# Patient Record
Sex: FEMALE | Race: WHITE | NOT HISPANIC OR LATINO
[De-identification: names, ages, dates, MRNs, and addresses within clinical notes are randomized per-mention and may not be internally consistent; named-entity substitution may affect disease eponyms.]

---

## 2017-04-06 PROBLEM — Z00.00 ENCOUNTER FOR PREVENTIVE HEALTH EXAMINATION: Status: ACTIVE | Noted: 2017-04-06

## 2017-04-17 ENCOUNTER — APPOINTMENT (OUTPATIENT)
Dept: ORTHOPEDIC SURGERY | Facility: CLINIC | Age: 82
End: 2017-04-17

## 2017-04-17 VITALS
HEIGHT: 59 IN | WEIGHT: 125 LBS | SYSTOLIC BLOOD PRESSURE: 174 MMHG | DIASTOLIC BLOOD PRESSURE: 80 MMHG | BODY MASS INDEX: 25.2 KG/M2

## 2017-05-22 ENCOUNTER — OUTPATIENT (OUTPATIENT)
Dept: OUTPATIENT SERVICES | Facility: HOSPITAL | Age: 82
LOS: 1 days | End: 2017-05-22
Payer: MEDICARE

## 2017-05-22 VITALS
WEIGHT: 126.1 LBS | HEART RATE: 77 BPM | SYSTOLIC BLOOD PRESSURE: 142 MMHG | RESPIRATION RATE: 16 BRPM | HEIGHT: 59 IN | TEMPERATURE: 98 F | DIASTOLIC BLOOD PRESSURE: 70 MMHG

## 2017-05-22 DIAGNOSIS — Z87.442 PERSONAL HISTORY OF URINARY CALCULI: Chronic | ICD-10-CM

## 2017-05-22 DIAGNOSIS — E89.0 POSTPROCEDURAL HYPOTHYROIDISM: Chronic | ICD-10-CM

## 2017-05-22 DIAGNOSIS — M48.07 SPINAL STENOSIS, LUMBOSACRAL REGION: ICD-10-CM

## 2017-05-22 DIAGNOSIS — Z98.890 OTHER SPECIFIED POSTPROCEDURAL STATES: Chronic | ICD-10-CM

## 2017-05-22 DIAGNOSIS — K57.90 DIVERTICULOSIS OF INTESTINE, PART UNSPECIFIED, WITHOUT PERFORATION OR ABSCESS WITHOUT BLEEDING: Chronic | ICD-10-CM

## 2017-05-22 DIAGNOSIS — Z90.49 ACQUIRED ABSENCE OF OTHER SPECIFIED PARTS OF DIGESTIVE TRACT: Chronic | ICD-10-CM

## 2017-05-22 DIAGNOSIS — I10 ESSENTIAL (PRIMARY) HYPERTENSION: ICD-10-CM

## 2017-05-22 DIAGNOSIS — E03.9 HYPOTHYROIDISM, UNSPECIFIED: ICD-10-CM

## 2017-05-22 DIAGNOSIS — Z90.710 ACQUIRED ABSENCE OF BOTH CERVIX AND UTERUS: Chronic | ICD-10-CM

## 2017-05-22 DIAGNOSIS — M48.00 SPINAL STENOSIS, SITE UNSPECIFIED: ICD-10-CM

## 2017-05-22 LAB
BLD GP AB SCN SERPL QL: NEGATIVE — SIGNIFICANT CHANGE UP
BUN SERPL-MCNC: 25 MG/DL — HIGH (ref 7–23)
CALCIUM SERPL-MCNC: 9.8 MG/DL — SIGNIFICANT CHANGE UP (ref 8.4–10.5)
CHLORIDE SERPL-SCNC: 102 MMOL/L — SIGNIFICANT CHANGE UP (ref 98–107)
CO2 SERPL-SCNC: 24 MMOL/L — SIGNIFICANT CHANGE UP (ref 22–31)
CREAT SERPL-MCNC: 1.03 MG/DL — SIGNIFICANT CHANGE UP (ref 0.5–1.3)
GLUCOSE SERPL-MCNC: 87 MG/DL — SIGNIFICANT CHANGE UP (ref 70–99)
HCT VFR BLD CALC: 38.9 % — SIGNIFICANT CHANGE UP (ref 34.5–45)
HGB BLD-MCNC: 12.5 G/DL — SIGNIFICANT CHANGE UP (ref 11.5–15.5)
MCHC RBC-ENTMCNC: 30.7 PG — SIGNIFICANT CHANGE UP (ref 27–34)
MCHC RBC-ENTMCNC: 32.1 % — SIGNIFICANT CHANGE UP (ref 32–36)
MCV RBC AUTO: 95.6 FL — SIGNIFICANT CHANGE UP (ref 80–100)
PLATELET # BLD AUTO: 392 K/UL — SIGNIFICANT CHANGE UP (ref 150–400)
PMV BLD: 9.6 FL — SIGNIFICANT CHANGE UP (ref 7–13)
POTASSIUM SERPL-MCNC: 4.7 MMOL/L — SIGNIFICANT CHANGE UP (ref 3.5–5.3)
POTASSIUM SERPL-SCNC: 4.7 MMOL/L — SIGNIFICANT CHANGE UP (ref 3.5–5.3)
RBC # BLD: 4.07 M/UL — SIGNIFICANT CHANGE UP (ref 3.8–5.2)
RBC # FLD: 12.6 % — SIGNIFICANT CHANGE UP (ref 10.3–14.5)
RH IG SCN BLD-IMP: NEGATIVE — SIGNIFICANT CHANGE UP
SODIUM SERPL-SCNC: 142 MMOL/L — SIGNIFICANT CHANGE UP (ref 135–145)
TSH SERPL-MCNC: 0.39 UIU/ML — SIGNIFICANT CHANGE UP (ref 0.27–4.2)
WBC # BLD: 8.23 K/UL — SIGNIFICANT CHANGE UP (ref 3.8–10.5)
WBC # FLD AUTO: 8.23 K/UL — SIGNIFICANT CHANGE UP (ref 3.8–10.5)

## 2017-05-22 PROCEDURE — 93010 ELECTROCARDIOGRAM REPORT: CPT

## 2017-05-22 NOTE — H&P PST ADULT - MUSCULOSKELETAL COMMENTS
Hx of arthritis pain for many years - pt c/o of lower back and left leg radiation with swelling of wrists and pain in shoulders, elbows and hips. Pt uses pain management inconsistently Pt c/o of pain over lower back, left leg and ankle, elbows. wrists. shoulders. Noted swelling of left wrist joint. Hx of arthritis pain for many years - pt c/o of lower back pain up to 9/10 and left leg radiation with swelling of wrists and pain in shoulders, elbows and hips. Pt uses pain management inconsistently

## 2017-05-22 NOTE — H&P PST ADULT - PMH
Detached retina    Diverticulosis    DVT (deep venous thrombosis)    HTN (hypertension)    Hypothyroid    OA (osteoarthritis)    OP (osteoporosis)    RA (rheumatoid arthritis)    Reflux esophagitis    Spinal stenosis

## 2017-05-22 NOTE — H&P PST ADULT - NEUROLOGICAL DETAILS
normal strength/sensation intact/responds to verbal commands/responds to pain/alert and oriented x 3

## 2017-05-22 NOTE — H&P PST ADULT - BACK EXAM
decreased strength/normal shape/difficulty with ROM r/t pain - occasionally uses cane and W/C to get around

## 2017-05-22 NOTE — H&P PST ADULT - PROBLEM SELECTOR PLAN 1
Pt given pre-op instructions and Famotidine and chlorhexidine and verbalized understanding.  Pt to see PCP for MC as per surgeon and to request copy.   OR Booking notified of IVC filter via fax.   Pt to take Prednisone and hydroxychloroquine am DOS

## 2017-05-22 NOTE — H&P PST ADULT - HISTORY OF PRESENT ILLNESS
Pt is a 83 yr old female scheduled for L3-4, L5-S1 Lumbar Laminectomy with Dr albarran 6/1/17. Pt c/o of chronic lower back pain for many years Pt is a 83 yr old female scheduled for L3-4, L5-S1 Lumbar Laminectomy with Dr albarran 6/1/17. Pt c/o of chronic lower back pain for many years with Hx of prior [id number]. Pt c/o of pain 10/10 - relieved partially with pain medication that she uses inconsistently. Pt has hx of RA and OA and on low dose Prednisone. Hx of DVT x2 in 2009 post-op surgery and has IVC filter in place.

## 2017-05-22 NOTE — H&P PST ADULT - NEGATIVE ENMT SYMPTOMS
no throat pain/no sinus symptoms/no dysphagia/no tinnitus/no vertigo/no nasal congestion/no nasal discharge/no ear pain

## 2017-05-22 NOTE — H&P PST ADULT - PSH
Diverticulosis  2004 - colon surgery  H/O abdominal hysterectomy  1977  H/O abdominal surgery  removal of appendix scarring - DVT postop  H/O laminectomy  1984  H/O total thyroidectomy  2008  History of appendectomy  1952  History of bladder surgery  1985  History of cholecystectomy  2015  History of kidney stones  1968

## 2017-05-24 ENCOUNTER — APPOINTMENT (OUTPATIENT)
Dept: ORTHOPEDIC SURGERY | Facility: CLINIC | Age: 82
End: 2017-05-24

## 2017-05-24 VITALS
DIASTOLIC BLOOD PRESSURE: 78 MMHG | HEIGHT: 59 IN | SYSTOLIC BLOOD PRESSURE: 167 MMHG | BODY MASS INDEX: 25.2 KG/M2 | HEART RATE: 102 BPM | WEIGHT: 125 LBS

## 2017-06-01 ENCOUNTER — TRANSCRIPTION ENCOUNTER (OUTPATIENT)
Age: 82
End: 2017-06-01

## 2017-06-01 ENCOUNTER — APPOINTMENT (OUTPATIENT)
Dept: ORTHOPEDIC SURGERY | Facility: HOSPITAL | Age: 82
End: 2017-06-01

## 2017-06-01 ENCOUNTER — INPATIENT (INPATIENT)
Facility: HOSPITAL | Age: 82
LOS: 4 days | Discharge: NURSING, REHAB OR LONG-TERM CARE | End: 2017-06-06
Attending: ORTHOPAEDIC SURGERY | Admitting: ORTHOPAEDIC SURGERY
Payer: MEDICARE

## 2017-06-01 VITALS
TEMPERATURE: 98 F | OXYGEN SATURATION: 100 % | DIASTOLIC BLOOD PRESSURE: 72 MMHG | SYSTOLIC BLOOD PRESSURE: 165 MMHG | RESPIRATION RATE: 16 BRPM | HEART RATE: 80 BPM | WEIGHT: 126.1 LBS | HEIGHT: 59 IN

## 2017-06-01 DIAGNOSIS — Z90.49 ACQUIRED ABSENCE OF OTHER SPECIFIED PARTS OF DIGESTIVE TRACT: Chronic | ICD-10-CM

## 2017-06-01 DIAGNOSIS — Z90.710 ACQUIRED ABSENCE OF BOTH CERVIX AND UTERUS: Chronic | ICD-10-CM

## 2017-06-01 DIAGNOSIS — Z98.890 OTHER SPECIFIED POSTPROCEDURAL STATES: Chronic | ICD-10-CM

## 2017-06-01 DIAGNOSIS — E89.0 POSTPROCEDURAL HYPOTHYROIDISM: Chronic | ICD-10-CM

## 2017-06-01 DIAGNOSIS — Z87.442 PERSONAL HISTORY OF URINARY CALCULI: Chronic | ICD-10-CM

## 2017-06-01 DIAGNOSIS — K57.90 DIVERTICULOSIS OF INTESTINE, PART UNSPECIFIED, WITHOUT PERFORATION OR ABSCESS WITHOUT BLEEDING: Chronic | ICD-10-CM

## 2017-06-01 DIAGNOSIS — M48.07 SPINAL STENOSIS, LUMBOSACRAL REGION: ICD-10-CM

## 2017-06-01 LAB
BASOPHILS # BLD AUTO: 0.04 K/UL — SIGNIFICANT CHANGE UP (ref 0–0.2)
BASOPHILS NFR BLD AUTO: 0.3 % — SIGNIFICANT CHANGE UP (ref 0–2)
BUN SERPL-MCNC: 27 MG/DL — HIGH (ref 7–23)
CALCIUM SERPL-MCNC: 9.2 MG/DL — SIGNIFICANT CHANGE UP (ref 8.4–10.5)
CHLORIDE SERPL-SCNC: 105 MMOL/L — SIGNIFICANT CHANGE UP (ref 98–107)
CO2 SERPL-SCNC: 26 MMOL/L — SIGNIFICANT CHANGE UP (ref 22–31)
CREAT SERPL-MCNC: 1.16 MG/DL — SIGNIFICANT CHANGE UP (ref 0.5–1.3)
EOSINOPHIL # BLD AUTO: 0.01 K/UL — SIGNIFICANT CHANGE UP (ref 0–0.5)
EOSINOPHIL NFR BLD AUTO: 0.1 % — SIGNIFICANT CHANGE UP (ref 0–6)
GLUCOSE SERPL-MCNC: 131 MG/DL — HIGH (ref 70–99)
HCT VFR BLD CALC: 33.5 % — LOW (ref 34.5–45)
HGB BLD-MCNC: 10.6 G/DL — LOW (ref 11.5–15.5)
IMM GRANULOCYTES NFR BLD AUTO: 0.4 % — SIGNIFICANT CHANGE UP (ref 0–1.5)
LYMPHOCYTES # BLD AUTO: 1.07 K/UL — SIGNIFICANT CHANGE UP (ref 1–3.3)
LYMPHOCYTES # BLD AUTO: 7.3 % — LOW (ref 13–44)
MCHC RBC-ENTMCNC: 30.1 PG — SIGNIFICANT CHANGE UP (ref 27–34)
MCHC RBC-ENTMCNC: 31.6 % — LOW (ref 32–36)
MCV RBC AUTO: 95.2 FL — SIGNIFICANT CHANGE UP (ref 80–100)
MONOCYTES # BLD AUTO: 0.68 K/UL — SIGNIFICANT CHANGE UP (ref 0–0.9)
MONOCYTES NFR BLD AUTO: 4.6 % — SIGNIFICANT CHANGE UP (ref 2–14)
NEUTROPHILS # BLD AUTO: 12.84 K/UL — HIGH (ref 1.8–7.4)
NEUTROPHILS NFR BLD AUTO: 87.3 % — HIGH (ref 43–77)
PLATELET # BLD AUTO: 354 K/UL — SIGNIFICANT CHANGE UP (ref 150–400)
PMV BLD: 9.4 FL — SIGNIFICANT CHANGE UP (ref 7–13)
POTASSIUM SERPL-MCNC: 4.4 MMOL/L — SIGNIFICANT CHANGE UP (ref 3.5–5.3)
POTASSIUM SERPL-SCNC: 4.4 MMOL/L — SIGNIFICANT CHANGE UP (ref 3.5–5.3)
RBC # BLD: 3.52 M/UL — LOW (ref 3.8–5.2)
RBC # FLD: 12.6 % — SIGNIFICANT CHANGE UP (ref 10.3–14.5)
RH IG SCN BLD-IMP: NEGATIVE — SIGNIFICANT CHANGE UP
SODIUM SERPL-SCNC: 143 MMOL/L — SIGNIFICANT CHANGE UP (ref 135–145)
WBC # BLD: 14.7 K/UL — HIGH (ref 3.8–10.5)
WBC # FLD AUTO: 14.7 K/UL — HIGH (ref 3.8–10.5)

## 2017-06-01 PROCEDURE — 63047 LAM FACETEC & FORAMOT LUMBAR: CPT

## 2017-06-01 PROCEDURE — 63710 GRAFT REPAIR OF SPINE DEFECT: CPT | Mod: 59

## 2017-06-01 PROCEDURE — 63048 LAM FACETEC &FORAMOT EA ADDL: CPT

## 2017-06-01 PROCEDURE — 63710 GRAFT REPAIR OF SPINE DEFECT: CPT | Mod: 82,59

## 2017-06-01 PROCEDURE — 63047 LAM FACETEC & FORAMOT LUMBAR: CPT | Mod: 82

## 2017-06-01 PROCEDURE — 63048 LAM FACETEC &FORAMOT EA ADDL: CPT | Mod: 82

## 2017-06-01 PROCEDURE — 72100 X-RAY EXAM L-S SPINE 2/3 VWS: CPT | Mod: 26

## 2017-06-01 RX ORDER — HYDROXYCHLOROQUINE SULFATE 200 MG
200 TABLET ORAL DAILY
Qty: 0 | Refills: 0 | Status: DISCONTINUED | OUTPATIENT
Start: 2017-06-01 | End: 2017-06-02

## 2017-06-01 RX ORDER — ACETAMINOPHEN 500 MG
650 TABLET ORAL EVERY 6 HOURS
Qty: 0 | Refills: 0 | Status: DISCONTINUED | OUTPATIENT
Start: 2017-06-01 | End: 2017-06-02

## 2017-06-01 RX ORDER — DULOXETINE HYDROCHLORIDE 30 MG/1
60 CAPSULE, DELAYED RELEASE ORAL DAILY
Qty: 0 | Refills: 0 | Status: DISCONTINUED | OUTPATIENT
Start: 2017-06-01 | End: 2017-06-06

## 2017-06-01 RX ORDER — DIPHENHYDRAMINE HCL 50 MG
12.5 CAPSULE ORAL EVERY 4 HOURS
Qty: 0 | Refills: 0 | Status: DISCONTINUED | OUTPATIENT
Start: 2017-06-01 | End: 2017-06-06

## 2017-06-01 RX ORDER — SODIUM CHLORIDE 9 MG/ML
1000 INJECTION, SOLUTION INTRAVENOUS
Qty: 0 | Refills: 0 | Status: DISCONTINUED | OUTPATIENT
Start: 2017-06-01 | End: 2017-06-02

## 2017-06-01 RX ORDER — ONDANSETRON 8 MG/1
4 TABLET, FILM COATED ORAL EVERY 6 HOURS
Qty: 0 | Refills: 0 | Status: DISCONTINUED | OUTPATIENT
Start: 2017-06-01 | End: 2017-06-06

## 2017-06-01 RX ORDER — FLUOCINONIDE/EMOLLIENT BASE 0.05 %
1 CREAM (GRAM) TOPICAL THREE TIMES A DAY
Qty: 0 | Refills: 0 | Status: DISCONTINUED | OUTPATIENT
Start: 2017-06-01 | End: 2017-06-06

## 2017-06-01 RX ORDER — MOMETASONE FUROATE 50 UG/1
2 SPRAY NASAL
Qty: 0 | Refills: 0 | COMMUNITY

## 2017-06-01 RX ORDER — ENOXAPARIN SODIUM 100 MG/ML
30 INJECTION SUBCUTANEOUS EVERY 24 HOURS
Qty: 0 | Refills: 0 | Status: COMPLETED | OUTPATIENT
Start: 2017-06-02 | End: 2017-06-03

## 2017-06-01 RX ORDER — ACETAMINOPHEN 500 MG
650 TABLET ORAL EVERY 6 HOURS
Qty: 0 | Refills: 0 | Status: DISCONTINUED | OUTPATIENT
Start: 2017-06-01 | End: 2017-06-06

## 2017-06-01 RX ORDER — CEFAZOLIN SODIUM 1 G
1000 VIAL (EA) INJECTION EVERY 8 HOURS
Qty: 0 | Refills: 0 | Status: COMPLETED | OUTPATIENT
Start: 2017-06-01 | End: 2017-06-02

## 2017-06-01 RX ORDER — DOCUSATE SODIUM 100 MG
100 CAPSULE ORAL THREE TIMES A DAY
Qty: 0 | Refills: 0 | Status: DISCONTINUED | OUTPATIENT
Start: 2017-06-01 | End: 2017-06-06

## 2017-06-01 RX ORDER — ONDANSETRON 8 MG/1
4 TABLET, FILM COATED ORAL ONCE
Qty: 0 | Refills: 0 | Status: DISCONTINUED | OUTPATIENT
Start: 2017-06-01 | End: 2017-06-01

## 2017-06-01 RX ORDER — HYDROMORPHONE HYDROCHLORIDE 2 MG/ML
0.25 INJECTION INTRAMUSCULAR; INTRAVENOUS; SUBCUTANEOUS
Qty: 0 | Refills: 0 | Status: DISCONTINUED | OUTPATIENT
Start: 2017-06-01 | End: 2017-06-01

## 2017-06-01 RX ORDER — HYDROMORPHONE HYDROCHLORIDE 2 MG/ML
0.5 INJECTION INTRAMUSCULAR; INTRAVENOUS; SUBCUTANEOUS
Qty: 0 | Refills: 0 | Status: DISCONTINUED | OUTPATIENT
Start: 2017-06-01 | End: 2017-06-01

## 2017-06-01 RX ORDER — VALSARTAN 80 MG/1
160 TABLET ORAL DAILY
Qty: 0 | Refills: 0 | Status: DISCONTINUED | OUTPATIENT
Start: 2017-06-01 | End: 2017-06-06

## 2017-06-01 RX ORDER — FLUTICASONE PROPIONATE 50 MCG
2 SPRAY, SUSPENSION NASAL DAILY
Qty: 0 | Refills: 0 | Status: DISCONTINUED | OUTPATIENT
Start: 2017-06-01 | End: 2017-06-06

## 2017-06-01 RX ORDER — MORPHINE SULFATE 50 MG/1
4 CAPSULE, EXTENDED RELEASE ORAL EVERY 4 HOURS
Qty: 0 | Refills: 0 | Status: DISCONTINUED | OUTPATIENT
Start: 2017-06-01 | End: 2017-06-02

## 2017-06-01 RX ORDER — SODIUM CHLORIDE 9 MG/ML
1000 INJECTION INTRAMUSCULAR; INTRAVENOUS; SUBCUTANEOUS
Qty: 0 | Refills: 0 | Status: DISCONTINUED | OUTPATIENT
Start: 2017-06-01 | End: 2017-06-04

## 2017-06-01 RX ORDER — SENNA PLUS 8.6 MG/1
2 TABLET ORAL AT BEDTIME
Qty: 0 | Refills: 0 | Status: DISCONTINUED | OUTPATIENT
Start: 2017-06-01 | End: 2017-06-06

## 2017-06-01 RX ORDER — GABAPENTIN 400 MG/1
200 CAPSULE ORAL AT BEDTIME
Qty: 0 | Refills: 0 | Status: DISCONTINUED | OUTPATIENT
Start: 2017-06-01 | End: 2017-06-02

## 2017-06-01 RX ORDER — FAMOTIDINE 10 MG/ML
20 INJECTION INTRAVENOUS EVERY 24 HOURS
Qty: 0 | Refills: 0 | Status: DISCONTINUED | OUTPATIENT
Start: 2017-06-01 | End: 2017-06-02

## 2017-06-01 RX ORDER — SODIUM CHLORIDE 9 MG/ML
500 INJECTION, SOLUTION INTRAVENOUS ONCE
Qty: 0 | Refills: 0 | Status: COMPLETED | OUTPATIENT
Start: 2017-06-01 | End: 2017-06-01

## 2017-06-01 RX ORDER — LEVOTHYROXINE SODIUM 125 MCG
88 TABLET ORAL DAILY
Qty: 0 | Refills: 0 | Status: DISCONTINUED | OUTPATIENT
Start: 2017-06-01 | End: 2017-06-06

## 2017-06-01 RX ADMIN — MORPHINE SULFATE 4 MILLIGRAM(S): 50 CAPSULE, EXTENDED RELEASE ORAL at 23:57

## 2017-06-01 RX ADMIN — SODIUM CHLORIDE 1000 MILLILITER(S): 9 INJECTION, SOLUTION INTRAVENOUS at 18:05

## 2017-06-01 RX ADMIN — HYDROMORPHONE HYDROCHLORIDE 0.5 MILLIGRAM(S): 2 INJECTION INTRAMUSCULAR; INTRAVENOUS; SUBCUTANEOUS at 16:52

## 2017-06-01 RX ADMIN — SODIUM CHLORIDE 30 MILLILITER(S): 9 INJECTION, SOLUTION INTRAVENOUS at 15:45

## 2017-06-01 RX ADMIN — HYDROMORPHONE HYDROCHLORIDE 0.5 MILLIGRAM(S): 2 INJECTION INTRAMUSCULAR; INTRAVENOUS; SUBCUTANEOUS at 19:05

## 2017-06-01 RX ADMIN — SENNA PLUS 2 TABLET(S): 8.6 TABLET ORAL at 21:42

## 2017-06-01 RX ADMIN — Medication 100 MILLIGRAM(S): at 21:42

## 2017-06-01 RX ADMIN — HYDROMORPHONE HYDROCHLORIDE 0.5 MILLIGRAM(S): 2 INJECTION INTRAMUSCULAR; INTRAVENOUS; SUBCUTANEOUS at 16:17

## 2017-06-01 RX ADMIN — GABAPENTIN 200 MILLIGRAM(S): 400 CAPSULE ORAL at 21:42

## 2017-06-01 RX ADMIN — HYDROMORPHONE HYDROCHLORIDE 0.5 MILLIGRAM(S): 2 INJECTION INTRAMUSCULAR; INTRAVENOUS; SUBCUTANEOUS at 16:10

## 2017-06-01 RX ADMIN — SODIUM CHLORIDE 75 MILLILITER(S): 9 INJECTION INTRAMUSCULAR; INTRAVENOUS; SUBCUTANEOUS at 16:00

## 2017-06-01 RX ADMIN — HYDROMORPHONE HYDROCHLORIDE 0.5 MILLIGRAM(S): 2 INJECTION INTRAMUSCULAR; INTRAVENOUS; SUBCUTANEOUS at 17:00

## 2017-06-01 RX ADMIN — MORPHINE SULFATE 4 MILLIGRAM(S): 50 CAPSULE, EXTENDED RELEASE ORAL at 23:43

## 2017-06-01 RX ADMIN — HYDROMORPHONE HYDROCHLORIDE 0.5 MILLIGRAM(S): 2 INJECTION INTRAMUSCULAR; INTRAVENOUS; SUBCUTANEOUS at 18:56

## 2017-06-01 NOTE — BRIEF OPERATIVE NOTE - PROCEDURE
Duraplasty  06/01/2017    Active  [email or web address]  Laminectomy of lumbar spine at 3 levels  06/01/2017    Active  BARBARA

## 2017-06-01 NOTE — PROGRESS NOTE ADULT - SUBJECTIVE AND OBJECTIVE BOX
Patient is seen and examined. Denies CP/SOB/Dizziness/N/V/D/HA. Pain is controlled.     Vital Signs Last 24 Hrs  T(C): 36.8, Max: 36.8 (06-01 @ 15:45)  T(F): 98.2, Max: 98.2 (06-01 @ 15:45)  HR: 84 (71 - 91)  BP: 127/59 (125/53 - 165/72)  BP(mean): --  RR: 18 (13 - 24)  SpO2: 98% (96% - 100%)    Gen: NAD    BACK: Dressing C/D/I. Drain in place (sewn). HOB Flat  Compartments are soft, extremities are warm. Motor intact 5/5 BL EHL/FHL/TA/GS. Sensation is grossly intact in the BL LE. 1+DP BL LE.     Labs:                           10.6   14.70 )-----------( 354      ( 01 Jun 2017 16:51 )             33.5     PENDING BMP Patient is seen and examined. Denies CP/SOB/Dizziness/N/V/D/HA. Pain is controlled.     Vital Signs Last 24 Hrs  T(C): 36.8, Max: 36.8 (06-01 @ 15:45)  T(F): 98.2, Max: 98.2 (06-01 @ 15:45)  HR: 84 (71 - 91)  BP: 127/59 (125/53 - 165/72)  BP(mean): --  RR: 18 (13 - 24)  SpO2: 98% (96% - 100%)    Gen: NAD    BACK: Dressing C/D/I. HOB Flat  Compartments are soft, extremities are warm. Motor intact 5/5 BL EHL/FHL/TA/GS. Sensation is grossly intact in the BL LE. 1+DP BL LE.     Labs:                           10.6   14.70 )-----------( 354      ( 01 Jun 2017 16:51 )             33.5     PENDING BMP

## 2017-06-01 NOTE — PROGRESS NOTE ADULT - PROBLEM SELECTOR PLAN 1
-Pain control/Analgesia  -Inc Spirometry  -Venodynes  -F/U AM Labs  -Continue to monitor motor sensory exam  -HOB Flat, NOT TO BE OUT OF BED UNTIL CLEARED BY DR GARCIA - approx 48 hours  -PT/OT - When cleared by Dr Garcia  -WBAT when cleared by Dr Garcia  -Monitor Drain output  -Notify Ortho with any questions -Pain control/Analgesia  -Inc Spirometry  -Venodynes  -F/U AM Labs  -Continue to monitor motor sensory exam  -HOB Flat, NOT TO BE OUT OF BED UNTIL CLEARED BY DR GARCIA - approx 48 hours  -PT/OT - When cleared by Dr Garcia  -WBAT when cleared by Dr Garcia  -Notify Ortho with any questions

## 2017-06-02 DIAGNOSIS — E03.9 HYPOTHYROIDISM, UNSPECIFIED: ICD-10-CM

## 2017-06-02 DIAGNOSIS — G96.11 DURAL TEAR: ICD-10-CM

## 2017-06-02 DIAGNOSIS — K21.0 GASTRO-ESOPHAGEAL REFLUX DISEASE WITH ESOPHAGITIS: ICD-10-CM

## 2017-06-02 DIAGNOSIS — Z98.890 OTHER SPECIFIED POSTPROCEDURAL STATES: ICD-10-CM

## 2017-06-02 DIAGNOSIS — D50.0 IRON DEFICIENCY ANEMIA SECONDARY TO BLOOD LOSS (CHRONIC): ICD-10-CM

## 2017-06-02 DIAGNOSIS — M48.00 SPINAL STENOSIS, SITE UNSPECIFIED: ICD-10-CM

## 2017-06-02 DIAGNOSIS — I82.409 ACUTE EMBOLISM AND THROMBOSIS OF UNSPECIFIED DEEP VEINS OF UNSPECIFIED LOWER EXTREMITY: ICD-10-CM

## 2017-06-02 DIAGNOSIS — D72.829 ELEVATED WHITE BLOOD CELL COUNT, UNSPECIFIED: ICD-10-CM

## 2017-06-02 DIAGNOSIS — M06.9 RHEUMATOID ARTHRITIS, UNSPECIFIED: ICD-10-CM

## 2017-06-02 LAB
BASOPHILS # BLD AUTO: 0.02 K/UL — SIGNIFICANT CHANGE UP (ref 0–0.2)
BASOPHILS NFR BLD AUTO: 0.2 % — SIGNIFICANT CHANGE UP (ref 0–2)
BUN SERPL-MCNC: 20 MG/DL — SIGNIFICANT CHANGE UP (ref 7–23)
CALCIUM SERPL-MCNC: 8.7 MG/DL — SIGNIFICANT CHANGE UP (ref 8.4–10.5)
CHLORIDE SERPL-SCNC: 105 MMOL/L — SIGNIFICANT CHANGE UP (ref 98–107)
CO2 SERPL-SCNC: 23 MMOL/L — SIGNIFICANT CHANGE UP (ref 22–31)
CREAT SERPL-MCNC: 0.88 MG/DL — SIGNIFICANT CHANGE UP (ref 0.5–1.3)
EOSINOPHIL # BLD AUTO: 0.02 K/UL — SIGNIFICANT CHANGE UP (ref 0–0.5)
EOSINOPHIL NFR BLD AUTO: 0.2 % — SIGNIFICANT CHANGE UP (ref 0–6)
GLUCOSE SERPL-MCNC: 90 MG/DL — SIGNIFICANT CHANGE UP (ref 70–99)
HCT VFR BLD CALC: 29 % — LOW (ref 34.5–45)
HGB BLD-MCNC: 9.1 G/DL — LOW (ref 11.5–15.5)
IMM GRANULOCYTES NFR BLD AUTO: 0.2 % — SIGNIFICANT CHANGE UP (ref 0–1.5)
LYMPHOCYTES # BLD AUTO: 0.98 K/UL — LOW (ref 1–3.3)
LYMPHOCYTES # BLD AUTO: 7.5 % — LOW (ref 13–44)
MCHC RBC-ENTMCNC: 30 PG — SIGNIFICANT CHANGE UP (ref 27–34)
MCHC RBC-ENTMCNC: 31.4 % — LOW (ref 32–36)
MCV RBC AUTO: 95.7 FL — SIGNIFICANT CHANGE UP (ref 80–100)
MONOCYTES # BLD AUTO: 1.65 K/UL — HIGH (ref 0–0.9)
MONOCYTES NFR BLD AUTO: 12.6 % — SIGNIFICANT CHANGE UP (ref 2–14)
NEUTROPHILS # BLD AUTO: 10.36 K/UL — HIGH (ref 1.8–7.4)
NEUTROPHILS NFR BLD AUTO: 79.3 % — HIGH (ref 43–77)
PLATELET # BLD AUTO: 320 K/UL — SIGNIFICANT CHANGE UP (ref 150–400)
PMV BLD: 9.4 FL — SIGNIFICANT CHANGE UP (ref 7–13)
POTASSIUM SERPL-MCNC: 4 MMOL/L — SIGNIFICANT CHANGE UP (ref 3.5–5.3)
POTASSIUM SERPL-SCNC: 4 MMOL/L — SIGNIFICANT CHANGE UP (ref 3.5–5.3)
RBC # BLD: 3.03 M/UL — LOW (ref 3.8–5.2)
RBC # FLD: 12.7 % — SIGNIFICANT CHANGE UP (ref 10.3–14.5)
SODIUM SERPL-SCNC: 142 MMOL/L — SIGNIFICANT CHANGE UP (ref 135–145)
WBC # BLD: 13.06 K/UL — HIGH (ref 3.8–10.5)
WBC # FLD AUTO: 13.06 K/UL — HIGH (ref 3.8–10.5)

## 2017-06-02 PROCEDURE — 99223 1ST HOSP IP/OBS HIGH 75: CPT

## 2017-06-02 RX ORDER — FAMOTIDINE 10 MG/ML
20 INJECTION INTRAVENOUS DAILY
Qty: 0 | Refills: 0 | Status: DISCONTINUED | OUTPATIENT
Start: 2017-06-02 | End: 2017-06-06

## 2017-06-02 RX ORDER — POLYETHYLENE GLYCOL 3350 17 G/17G
17 POWDER, FOR SOLUTION ORAL DAILY
Qty: 0 | Refills: 0 | Status: DISCONTINUED | OUTPATIENT
Start: 2017-06-02 | End: 2017-06-06

## 2017-06-02 RX ORDER — HYDROXYCHLOROQUINE SULFATE 200 MG
200 TABLET ORAL
Qty: 0 | Refills: 0 | Status: DISCONTINUED | OUTPATIENT
Start: 2017-06-02 | End: 2017-06-05

## 2017-06-02 RX ORDER — GABAPENTIN 400 MG/1
200 CAPSULE ORAL AT BEDTIME
Qty: 0 | Refills: 0 | Status: DISCONTINUED | OUTPATIENT
Start: 2017-06-02 | End: 2017-06-06

## 2017-06-02 RX ORDER — CYCLOBENZAPRINE HYDROCHLORIDE 10 MG/1
5 TABLET, FILM COATED ORAL THREE TIMES A DAY
Qty: 0 | Refills: 0 | Status: DISCONTINUED | OUTPATIENT
Start: 2017-06-02 | End: 2017-06-06

## 2017-06-02 RX ORDER — OXYCODONE HYDROCHLORIDE 5 MG/1
10 TABLET ORAL EVERY 4 HOURS
Qty: 0 | Refills: 0 | Status: DISCONTINUED | OUTPATIENT
Start: 2017-06-02 | End: 2017-06-06

## 2017-06-02 RX ORDER — OXYCODONE HYDROCHLORIDE 5 MG/1
5 TABLET ORAL EVERY 4 HOURS
Qty: 0 | Refills: 0 | Status: DISCONTINUED | OUTPATIENT
Start: 2017-06-02 | End: 2017-06-06

## 2017-06-02 RX ORDER — MORPHINE SULFATE 50 MG/1
2 CAPSULE, EXTENDED RELEASE ORAL EVERY 4 HOURS
Qty: 0 | Refills: 0 | Status: DISCONTINUED | OUTPATIENT
Start: 2017-06-02 | End: 2017-06-06

## 2017-06-02 RX ORDER — HYDROMORPHONE HYDROCHLORIDE 2 MG/ML
0.5 INJECTION INTRAMUSCULAR; INTRAVENOUS; SUBCUTANEOUS ONCE
Qty: 0 | Refills: 0 | Status: DISCONTINUED | OUTPATIENT
Start: 2017-06-02 | End: 2017-06-02

## 2017-06-02 RX ORDER — ACETAMINOPHEN 500 MG
650 TABLET ORAL EVERY 6 HOURS
Qty: 0 | Refills: 0 | Status: COMPLETED | OUTPATIENT
Start: 2017-06-02 | End: 2017-06-04

## 2017-06-02 RX ORDER — OXYCODONE HYDROCHLORIDE 5 MG/1
5 TABLET ORAL EVERY 4 HOURS
Qty: 0 | Refills: 0 | Status: DISCONTINUED | OUTPATIENT
Start: 2017-06-02 | End: 2017-06-02

## 2017-06-02 RX ORDER — OXYCODONE HYDROCHLORIDE 5 MG/1
2.5 TABLET ORAL EVERY 4 HOURS
Qty: 0 | Refills: 0 | Status: DISCONTINUED | OUTPATIENT
Start: 2017-06-02 | End: 2017-06-06

## 2017-06-02 RX ORDER — HYDROMORPHONE HYDROCHLORIDE 2 MG/ML
0.5 INJECTION INTRAMUSCULAR; INTRAVENOUS; SUBCUTANEOUS EVERY 4 HOURS
Qty: 0 | Refills: 0 | Status: DISCONTINUED | OUTPATIENT
Start: 2017-06-02 | End: 2017-06-02

## 2017-06-02 RX ORDER — HYDROMORPHONE HYDROCHLORIDE 2 MG/ML
1 INJECTION INTRAMUSCULAR; INTRAVENOUS; SUBCUTANEOUS
Qty: 0 | Refills: 0 | Status: DISCONTINUED | OUTPATIENT
Start: 2017-06-02 | End: 2017-06-06

## 2017-06-02 RX ADMIN — VALSARTAN 160 MILLIGRAM(S): 80 TABLET ORAL at 05:19

## 2017-06-02 RX ADMIN — Medication 650 MILLIGRAM(S): at 12:31

## 2017-06-02 RX ADMIN — HYDROMORPHONE HYDROCHLORIDE 0.5 MILLIGRAM(S): 2 INJECTION INTRAMUSCULAR; INTRAVENOUS; SUBCUTANEOUS at 06:34

## 2017-06-02 RX ADMIN — MORPHINE SULFATE 2 MILLIGRAM(S): 50 CAPSULE, EXTENDED RELEASE ORAL at 08:10

## 2017-06-02 RX ADMIN — Medication 100 MILLIGRAM(S): at 05:22

## 2017-06-02 RX ADMIN — Medication 200 MILLIGRAM(S): at 17:21

## 2017-06-02 RX ADMIN — HYDROMORPHONE HYDROCHLORIDE 0.5 MILLIGRAM(S): 2 INJECTION INTRAMUSCULAR; INTRAVENOUS; SUBCUTANEOUS at 03:25

## 2017-06-02 RX ADMIN — MORPHINE SULFATE 2 MILLIGRAM(S): 50 CAPSULE, EXTENDED RELEASE ORAL at 08:30

## 2017-06-02 RX ADMIN — Medication 650 MILLIGRAM(S): at 17:21

## 2017-06-02 RX ADMIN — POLYETHYLENE GLYCOL 3350 17 GRAM(S): 17 POWDER, FOR SOLUTION ORAL at 12:32

## 2017-06-02 RX ADMIN — HYDROMORPHONE HYDROCHLORIDE 1 MILLIGRAM(S): 2 INJECTION INTRAMUSCULAR; INTRAVENOUS; SUBCUTANEOUS at 18:10

## 2017-06-02 RX ADMIN — HYDROMORPHONE HYDROCHLORIDE 1 MILLIGRAM(S): 2 INJECTION INTRAMUSCULAR; INTRAVENOUS; SUBCUTANEOUS at 17:45

## 2017-06-02 RX ADMIN — OXYCODONE HYDROCHLORIDE 10 MILLIGRAM(S): 5 TABLET ORAL at 11:23

## 2017-06-02 RX ADMIN — ENOXAPARIN SODIUM 30 MILLIGRAM(S): 100 INJECTION SUBCUTANEOUS at 05:19

## 2017-06-02 RX ADMIN — Medication 100 MILLIGRAM(S): at 13:43

## 2017-06-02 RX ADMIN — FAMOTIDINE 20 MILLIGRAM(S): 10 INJECTION INTRAVENOUS at 12:31

## 2017-06-02 RX ADMIN — MORPHINE SULFATE 2 MILLIGRAM(S): 50 CAPSULE, EXTENDED RELEASE ORAL at 13:43

## 2017-06-02 RX ADMIN — DULOXETINE HYDROCHLORIDE 60 MILLIGRAM(S): 30 CAPSULE, DELAYED RELEASE ORAL at 12:31

## 2017-06-02 RX ADMIN — Medication 5 MILLIGRAM(S): at 12:31

## 2017-06-02 RX ADMIN — GABAPENTIN 200 MILLIGRAM(S): 400 CAPSULE ORAL at 17:22

## 2017-06-02 RX ADMIN — OXYCODONE HYDROCHLORIDE 10 MILLIGRAM(S): 5 TABLET ORAL at 11:58

## 2017-06-02 RX ADMIN — Medication 88 MICROGRAM(S): at 05:19

## 2017-06-02 RX ADMIN — MORPHINE SULFATE 2 MILLIGRAM(S): 50 CAPSULE, EXTENDED RELEASE ORAL at 14:15

## 2017-06-02 RX ADMIN — HYDROMORPHONE HYDROCHLORIDE 0.5 MILLIGRAM(S): 2 INJECTION INTRAMUSCULAR; INTRAVENOUS; SUBCUTANEOUS at 06:49

## 2017-06-02 RX ADMIN — SENNA PLUS 2 TABLET(S): 8.6 TABLET ORAL at 22:23

## 2017-06-02 RX ADMIN — HYDROMORPHONE HYDROCHLORIDE 0.5 MILLIGRAM(S): 2 INJECTION INTRAMUSCULAR; INTRAVENOUS; SUBCUTANEOUS at 03:40

## 2017-06-02 RX ADMIN — Medication 100 MILLIGRAM(S): at 22:23

## 2017-06-02 RX ADMIN — Medication 200 MILLIGRAM(S): at 08:10

## 2017-06-02 RX ADMIN — CYCLOBENZAPRINE HYDROCHLORIDE 5 MILLIGRAM(S): 10 TABLET, FILM COATED ORAL at 10:18

## 2017-06-02 RX ADMIN — Medication 100 MILLIGRAM(S): at 05:19

## 2017-06-02 NOTE — DISCHARGE NOTE ADULT - PATIENT PORTAL LINK FT
“You can access the [facility] Patient Portal, offered by [facility], by registering with the following website: [email or web address]”

## 2017-06-02 NOTE — PROGRESS NOTE ADULT - SUBJECTIVE AND OBJECTIVE BOX
ORTHO/SPINE PA NOTE    PILI PAULINO 83yFemale  was seen by me and Dr. Benavidez, pt laying in bed comfortable w/o complaints.  Resolved legs pains from pre-op.  Overall doing well.  Denies any CP, SOB, HA's.    Spine-  dressing clean/dry/intact;   RLE- motor 5/5 quad, TA, EHL, GS  LLE- motor 5/5 quad, TA, EHL, GS          NVI distally and symmetrical    A/P-83y Female  s/p a laminectomy and dural repair  -Analgesia  -bed rest lying flat until tomorrow afternoon  -cont IS  -cont DVT PPX  -check labs  -F/u internal med recs  -out of bed to chair most likely tomorrow afternoon  -D/C planning the rehabilitation Monday/Tuesday  -D/w Dr. Pramod LOPEZ, PA

## 2017-06-02 NOTE — CONSULT NOTE ADULT - SUBJECTIVE AND OBJECTIVE BOX
Patient is a 83y old  Female who presents with a chief complaint of L2-5 lami 6/1/17 (02 Jun 2017 07:29)      HPI:  Pt is a 83 yr old female scheduled for L3-4, L5-S1 Lumbar Laminectomy with Dr albarran 6/1/17. Pt c/o of chronic lower back pain for many years with Hx of prior [id number]. Pt c/o of pain 10/10 - relieved partially with pain medication that she uses inconsistently. Pt has hx of RA and OA and on low dose Prednisone. Hx of DVT x2 in 2009 post-op surgery and has IVC filter in place. (22 May 2017 09:54) Patient received L2-5 laminectomy complicated by laceration of the dura.  Currently patient is in 10/10 pain, achy/sharp in nature at the surgical site, worse with small movement, mild improvement with pain meds, no radiation, no associated symptoms.  Patient was told that she needs to bedrest for 48 hours due to dura laceration.  No F/C, N/V, abdominal pain, dizziness, LOC, SZ, headaches, change in vision, CP, SOB, LE weakness or numbness.      Pain Symptoms if applicable:             	                         none	   mild         moderate         severe  Pain:	10                            0	    1-3	     4-6	         7-10  Location:	Surgical site  Modifying factors:	Worse with movement  Associated symptoms:	None    Allergies    No Known Allergies    Intolerances        HOME MEDICATIONS: Reviewed    MEDICATIONS  (STANDING):  enoxaparin Injectable 30milliGRAM(s) SubCutaneous every 24 hours  sodium chloride 0.9%. 1000milliLiter(s) IV Continuous <Continuous>  docusate sodium 100milliGRAM(s) Oral three times a day  senna 2Tablet(s) Oral at bedtime  valsartan 160milliGRAM(s) Oral daily  DULoxetine 60milliGRAM(s) Oral daily  fluticasone propionate 50 MICROgram(s)/spray Nasal Spray 2Spray(s) Both Nostrils daily  levothyroxine 88MICROGram(s) Oral daily  gabapentin 200milliGRAM(s) Oral at bedtime  hydroxychloroquine 200milliGRAM(s) Oral two times a day with meals  famotidine    Tablet 20milliGRAM(s) Oral daily  polyethylene glycol 3350 17Gram(s) Oral daily  acetaminophen   Tablet. 650milliGRAM(s) Oral every 6 hours  predniSONE   Tablet 5milliGRAM(s) Oral daily    MEDICATIONS  (PRN):  acetaminophen   Tablet 650milliGRAM(s) Oral every 6 hours PRN For Temp greater than 38 C (100.4 F)  diphenhydrAMINE   Injectable 12.5milliGRAM(s) IV Push every 4 hours PRN Itching  aluminum hydroxide/magnesium hydroxide/simethicone Suspension 30milliLiter(s) Oral every 12 hours PRN Indigestion  ondansetron Injectable 4milliGRAM(s) IV Push every 6 hours PRN Nausea  fluocinonide 0.05% Cream 1Application(s) Topical three times a day PRN home med  oxyCODONE IR 5milliGRAM(s) Oral every 4 hours PRN Moderate Pain (4 - 6)  oxyCODONE IR 10milliGRAM(s) Oral every 4 hours PRN Severe Pain (7 - 10)  morphine  - Injectable 2milliGRAM(s) IV Push every 4 hours PRN Breakthrough Pain  oxyCODONE IR 2.5milliGRAM(s) Oral every 4 hours PRN Mild Pain (1 - 3)  cyclobenzaprine 5milliGRAM(s) Oral three times a day PRN Muscle Spasm      PAST MEDICAL & SURGICAL HISTORY:  Reflux esophagitis  Diverticulosis  OP (osteoporosis)  Spinal stenosis  Hypothyroid  HTN (hypertension)  Detached retina  RA (rheumatoid arthritis)  OA (osteoarthritis)  DVT (deep venous thrombosis)  History of cholecystectomy: 2015  H/O abdominal surgery: removal of appendix scarring - DVT postop  H/O total thyroidectomy: 2008  Diverticulosis: 2004 - colon surgery  History of bladder surgery: 1985  H/O laminectomy: 1984  H/O abdominal hysterectomy: 1977  History of kidney stones: 1968  History of appendectomy: 1952      SOCIAL HISTORY:  No tobacco/alcohol use/abuse.    FAMILY HISTORY:      REVIEW OF SYSTEMS:    CONSTITUTIONAL: No fever, weight loss, or fatigue  EYES: No eye pain, visual disturbances, or discharge  ENMT:  No difficulty hearing, tinnitus, vertigo; No sinus or throat pain  NECK: No pain or stiffness  BREASTS: No pain, masses, or nipple discharge  RESPIRATORY: No cough, wheezing, chills or hemoptysis; No shortness of breath  CARDIOVASCULAR: No chest pain, palpitations, dizziness, or leg swelling  GASTROINTESTINAL: No abdominal or epigastric pain. No nausea, vomiting, or hematemesis; No diarrhea or constipation. No melena or hematochezia.  GENITOURINARY: No dysuria, frequency, hematuria, or incontinence  NEUROLOGICAL: No headaches, memory loss, loss of strength, numbness, or tremors  SKIN: No itching, burning, rashes, or lesions   LYMPH NODES: No enlarged glands  ENDOCRINE: No heat or cold intolerance; No hair loss  MUSCULOSKELETAL: Significant back pain.  PSYCHIATRIC: No depression, anxiety, mood swings, or difficulty sleeping  HEME/LYMPH: No easy bruising, or bleeding gums  ALLERGY AND IMMUNOLOGIC: No hives or eczema      Vital Signs Last 24 Hrs  T(C): 36.7, Max: 37.2 (06-01 @ 21:29)  T(F): 98, Max: 99 (06-01 @ 21:29)  HR: 94 (71 - 99)  BP: 118/55 (105/51 - 142/72)  BP(mean): --  RR: 17 (12 - 24)  SpO2: 98% (94% - 100%)  CAPILLARY BLOOD GLUCOSE      PHYSICAL EXAM:    GENERAL: NAD, well-groomed, well-developed  HEAD:  Atraumatic, Normocephalic  EYES: EOMI, PERRLA, conjunctiva and sclera clear  ENMT: Moist mucous membranes  NECK: Supple, No JVD  RESPIRATORY: Clear to auscultation bilaterally; No rales, rhonchi, wheezing, or rubs  CARDIOVASCULAR: Regular rate and rhythm; No murmurs, rubs, or gallops  GASTROINTESTINAL: Soft, Nontender, Nondistended; Bowel sounds present  BACK: Marked tenderness.  GENITOURINARY: Not examined  EXTREMITIES:  2+ Peripheral Pulses, No clubbing, cyanosis, or edema  NERVOUS SYSTEM:  Alert & Oriented X3; Moving all 4 extremities; No gross sensory deficits  PSYCH: Calm  HEME/LYMPH: No lymphadenopathy noted  SKIN: No rashes or lesions; Incisions C/D/I    LABS:                        9.1    13.06 )-----------( 320      ( 02 Jun 2017 05:30 )             29.0     06-02    142  |  105  |  20  ----------------------------<  90  4.0   |  23  |  0.88    Ca    8.7      02 Jun 2017 05:30      RADIOLOGY & ADDITIONAL STUDIES:  EXAM:  RAD LUMBAR SPINE AP LAT        PROCEDURE DATE:  Jun 1 2017         INTERPRETATION:  CLINICAL INDICATION: Lumbar spine surgery;   intraoperative localization.    EXAM:  Limited intraoperative portable crosstable lateral lumbosacral spine from   6/1/2017 at 1309.    For purposes of this dictation, the lowest fully formed disc space is   considered to be L5-S1.    IMPRESSION:  Distal tips of 2 surgical clamps project over what appear to be the L3   and L5 spinous processes.     Multilevel degenerative change including spinous process abutment also   apparent.    Correlate with preoperative workup and intraoperative findings.    Trapeze shaped upper paralumbar IVC filter and upper abdominal surgical   clips also noted.    FREDDY PALOMARES M.D., ATTENDING RADIOLOGIST  This document has been electronically signed. Jun 1 2017  2:15PM  Imaging:   Personally Reviewed:  [x] YES                   Consultant(s) notes reviewed:  Anesthesia    Care Discussed with Primary Team.    [x] Increased delirium risk  [x] Delirium and other risks can be reduced by:          -early ambulation          -minimizing "tethers" - IV, oxygen, catheters, etc          -avoiding hypnotics and sedatives          -maintaining hydration/nutrition          -avoid anticholinergics - diphenhydramine, etc          -pain control          -supportive environment

## 2017-06-02 NOTE — CONSULT NOTE ADULT - PROBLEM SELECTOR PROBLEM 7
Acquired hypothyroidism Rheumatoid arthritis, involving unspecified site, unspecified rheumatoid factor presence

## 2017-06-02 NOTE — CONSULT NOTE ADULT - ASSESSMENT
83F spinal stenosis s/p L2-5 laminectomy complicated by dural tear, acute post-op anemia, leukocytosis, HTN, hypothyroid, RA, GERD.

## 2017-06-02 NOTE — DISCHARGE NOTE ADULT - PLAN OF CARE
pain reduction weight bear as tolerated   resume same diet as prior to surgery   Dr Benavidez 1 week call for appt [phone] pain reduction, improve ambulation and ADLs Patient s/p L2-L5 laminecotomy with Dr Benavidez on 6/1/17. Patient tolerated the procedure well, found to sustain an intraoperative dural tear. Patient placed on bedrest and cleared for PT once approved by attending. Patient tolerated physical therapy and pain is controlled. Seen by medical attending for continuity of care and management and cleared for safe discharge. As per surgeon patient stable and ready for discharge.     Please follow up with  in 1-2 weeks. Call office to make an appointment. Please follow up with your PMD for continuity of care and management as medications may have changed. Do not take any NSAIDS (motrin, advil, ibuprofren, aleve), Aspirin, Antiinflammatory medications unless instructed by your orthopaedic surgeon to continue. Avoid any heavy lifting, bending, squatting, twisting motion. Keep dressing/incision clean, dry, intact. Any suture/staples to be removed on post op day # 14 at office visit. Weight bear as tolerated. Patient s/p L2-L5 laminecotomy with Dr Benavidez on 6/1/17. Patient tolerated the procedure well, found to sustain an intraoperative dural tear. Patient placed on bedrest and cleared for PT once approved by attending. Patient tolerated physical therapy and pain is controlled. Seen by medical attending for continuity of care and management and cleared for safe discharge. As per surgeon patient stable and ready for discharge.     Please follow up with  in 1-2 weeks. Call office to make an appointment. Please follow up with your PMD for continuity of care and management as medications may have changed. Do not take any NSAIDS (motrin, advil, ibuprofren, aleve), Aspirin, Antiinflammatory medications unless instructed by your orthopaedic surgeon to continue. Avoid any heavy lifting, bending, squatting, twisting motion. Keep dressing/incision clean, dry, intact. Any suture/staples to be removed on post op day # 14 at office visit. Weight bear as tolerated. Please follow up with Dr Benavidez at office visit about resuming plaquenil medication.

## 2017-06-02 NOTE — CONSULT NOTE ADULT - PROBLEM SELECTOR PROBLEM 5
Rheumatoid arthritis, involving unspecified site, unspecified rheumatoid factor presence Leukocytosis, unspecified type

## 2017-06-02 NOTE — CONSULT NOTE ADULT - PROBLEM SELECTOR PROBLEM 8
Reflux esophagitis Deep vein thrombosis (DVT) of lower extremity, unspecified chronicity, unspecified laterality, unspecified vein

## 2017-06-02 NOTE — DISCHARGE NOTE ADULT - CARE PROVIDER_API CALL
Hudson Benavidez (MD; DC), Orthopaedic Surgery  [address] [place] [address]  Phone: [phone]  Fax: [phone]

## 2017-06-02 NOTE — CONSULT NOTE ADULT - PROBLEM SELECTOR RECOMMENDATION 5
Continue plaquenil. Likely related to steroid and post-op reactive phase.  No clinical sign of infection at this time.

## 2017-06-02 NOTE — PROGRESS NOTE ADULT - SUBJECTIVE AND OBJECTIVE BOX
ANESTHESIA POSTOP CHECK    83y Female POSTOP DAY 1 S/P     Vital Signs Last 24 Hrs  T(C): 36.9, Max: 37.2 (06-01 @ 21:29)  T(F): 98.4, Max: 99 (06-01 @ 21:29)  HR: 99 (71 - 99)  BP: 120/52 (105/51 - 142/72)  BP(mean): --  RR: 17 (12 - 24)  SpO2: 94% (94% - 100%)  I&O's Summary    I & Os for current day (as of 02 Jun 2017 12:10)  =============================================  IN: 850 ml / OUT: 725 ml / NET: 125 ml      [x ] NO APPARENT ANESTHESIA COMPLICATIONS

## 2017-06-02 NOTE — CONSULT NOTE ADULT - PROBLEM SELECTOR PROBLEM 6
Deep vein thrombosis (DVT) of lower extremity, unspecified chronicity, unspecified laterality, unspecified vein HTN (hypertension)

## 2017-06-02 NOTE — PROGRESS NOTE ADULT - SUBJECTIVE AND OBJECTIVE BOX
No acute events overnight. Patient experiencing incisional back pain, not well controlled with medication. No headaches, lightheadedness or dizziness overnight.    Vital Signs Last 24 Hrs  T(C): 36.8, Max: 37.2 (06-01 @ 21:29)  T(F): 98.3, Max: 99 (06-01 @ 21:29)  HR: 96 (71 - 97)  BP: 141/73 (105/51 - 165/72)  BP(mean): --  RR: 19 (12 - 24)  SpO2: 98% (95% - 100%)    Exam:  Gen: NAD  Motor: 5/5 EHL/FHL/TA/Gastrocnemius  Sensory: SILT DP/SP/S/S/T nerve distributions  Vascular: 2+ Dorsalis Pedis pulse  Dressing: Clean, Dry, Intact    A/P: 83 year old female s/p L2-5 Laminectomy  - Pain Control - Increased pain medication, will give breakthrough Dilaudid now  - Regular Diet  - Bedrest, head of bed down for 48 hours. Monitor for headaches, dizziness  - Lovenox

## 2017-06-02 NOTE — DISCHARGE NOTE ADULT - CARE PROVIDERS DIRECT ADDRESSES
[email or web address], [email or web address]

## 2017-06-02 NOTE — DISCHARGE NOTE ADULT - CONDITIONS AT DISCHARGE
stable stable. Back dressing clean dry and intact. Positive NV statrus, VS stable, afebrile. pt josé miguel po diet well and voding without difficulty. Pt seen by PT and cleared for DC to home as per safe Dc plan.

## 2017-06-02 NOTE — DISCHARGE NOTE ADULT - MEDICATION SUMMARY - MEDICATIONS TO TAKE
I will START or STAY ON the medications listed below when I get home from the hospital:    Citral + D slow rel. 600 mg Aleksey 500 units vit D  -- 1 tab(s) by mouth once a day AM  -- Indication: For Home med    lidocaine patch  -- 700 milligram(s) by transdermal patch , As Needed  -- Indication: For Home med    predniSONE 5 mg oral tablet  -- 1 tab(s) by mouth once a day  -- Indication: For Home mked    oxyCODONE 5 mg oral tablet  -- 1 tab(s) by mouth every 4 hours, As needed, Moderate Pain (4 - 6)  -- Indication: For pain    valsartan 160 mg oral tablet  -- 1 tab(s) by mouth once a day  -- Indication: For Home med    gabapentin 100 mg oral capsule  -- 2 cap(s) by mouth once a day (at bedtime)  -- Indication: For pain    DULoxetine 60 mg oral delayed release capsule  -- 1 cap(s) by mouth once a day  -- Indication: For Home med    fluocinonide 0.05% topical cream  -- 1 application on skin 3 times a day, As needed, home med  -- Indication: For Home emd    docusate sodium 100 mg oral capsule  -- 1 cap(s) by mouth 3 times a day  -- Indication: For bowel    polyethylene glycol 3350 oral powder for reconstitution  -- 17 gram(s) by mouth once a day  -- Indication: For bowel    Nasonex 50 mcg/inh nasal spray  -- 2 spray(s) into nose once a day, As Needed  -- Indication: For Home med    levothyroxine 88 mcg (0.088 mg) oral tablet  -- 1 tab(s) by mouth once a day  -- Indication: For Home med    Centrum Silver oral tablet  -- 1 tab(s) by mouth once a day LD 5/24/2017  -- Indication: For Home med I will START or STAY ON the medications listed below when I get home from the hospital:    predniSONE 5 mg oral tablet  -- 1 tab(s) by mouth once a day  -- Indication: For Home med     oxyCODONE 5 mg oral tablet  -- 1 tab(s) by mouth every 4-6 hours, As needed, Moderate Pain   -- Indication: For pain med     oxyCODONE 10 mg oral tablet  -- 1 tab(s) by mouth every 4-6 hours, As needed, Severe Pain  -- Indication: For pain med     oxyCODONE  -- 2.5 milligram(s) by mouth every 4 to 6 hours PRN Mild pain     -- Indication: For pain med     valsartan 160 mg oral tablet  -- 1 tab(s) by mouth once a day  -- Indication: For Home med    gabapentin 100 mg oral capsule  -- 2 cap(s) by mouth once a day (at bedtime)  -- Indication: For pain med     DULoxetine 60 mg oral delayed release capsule  -- 1 cap(s) by mouth once a day  -- Indication: For Home med    albuterol 90 mcg/inh inhalation aerosol  -- 2 puff(s) inhaled every 6 hours, As needed, Bronchospasm  -- Indication: For Asthma     fluocinonide 0.05% topical cream  -- 1 application on skin 3 times a day, As needed, home med  -- Indication: For Home emd    guaiFENesin 100 mg/5 mL oral liquid  -- 5 milliliter(s) by mouth every 6 hours, As needed, Cough  -- Indication: For cough    famotidine 20 mg oral tablet  -- 1 tab(s) by mouth once a day  -- Indication: For gerd    senna oral tablet  -- 2 tab(s) by mouth once a day (at bedtime)  -- Indication: For Stool softener     docusate sodium 100 mg oral capsule  -- 1 cap(s) by mouth 3 times a day  -- Indication: For Stool softener     polyethylene glycol 3350 oral powder for reconstitution  -- 17 gram(s) by mouth once a day  -- Indication: For Stool softener     cyclobenzaprine 5 mg oral tablet  -- 1 tab(s) by mouth 3 times a day, As needed, Muscle Spasm  -- Indication: For Spasms     Nasonex 50 mcg/inh nasal spray  -- 2 spray(s) into nose once a day, As Needed  -- Indication: For Home med     levothyroxine 88 mcg (0.088 mg) oral tablet  -- 1 tab(s) by mouth once a day  -- Indication: For Home med

## 2017-06-02 NOTE — DISCHARGE NOTE ADULT - CARE PLAN
Principal Discharge DX:	Spinal stenosis  Goal:	pain reduction  Instructions for follow-up, activity and diet:	weight bear as tolerated   resume same diet as prior to surgery   Dr Benavidez 1 week call for appt [phone] Principal Discharge DX:	Spinal stenosis  Goal:	pain reduction  Instructions for follow-up, activity and diet:	weight bear as tolerated   resume same diet as prior to surgery   Dr Benavidez 1 week call for appt [phone] Principal Discharge DX:	Spinal stenosis  Goal:	pain reduction  Instructions for follow-up, activity and diet:	weight bear as tolerated   resume same diet as prior to surgery   Dr Benavidez 1 week call for appt [phone] Principal Discharge DX:	Spinal stenosis  Goal:	pain reduction  Instructions for follow-up, activity and diet:	weight bear as tolerated   resume same diet as prior to surgery   Dr Benavidez 1 week call for appt [phone] Principal Discharge DX:	Spinal stenosis  Goal:	pain reduction  Instructions for follow-up, activity and diet:	weight bear as tolerated   resume same diet as prior to surgery   Dr Benavidez 1 week call for appt [phone] Principal Discharge DX:	Spinal stenosis  Goal:	pain reduction, improve ambulation and ADLs  Instructions for follow-up, activity and diet:	Patient s/p L2-L5 laminecotomy with Dr Benavidez on 6/1/17. Patient tolerated the procedure well, found to sustain an intraoperative dural tear. Patient placed on bedrest and cleared for PT once approved by attending. Patient tolerated physical therapy and pain is controlled. Seen by medical attending for continuity of care and management and cleared for safe discharge. As per surgeon patient stable and ready for discharge.     Please follow up with  in 1-2 weeks. Call office to make an appointment. Please follow up with your PMD for continuity of care and management as medications may have changed. Do not take any NSAIDS (motrin, advil, ibuprofren, aleve), Aspirin, Antiinflammatory medications unless instructed by your orthopaedic surgeon to continue. Avoid any heavy lifting, bending, squatting, twisting motion. Keep dressing/incision clean, dry, intact. Any suture/staples to be removed on post op day # 14 at office visit. Weight bear as tolerated. Principal Discharge DX:	Spinal stenosis  Goal:	pain reduction, improve ambulation and ADLs  Instructions for follow-up, activity and diet:	Patient s/p L2-L5 laminecotomy with Dr Benavidez on 6/1/17. Patient tolerated the procedure well, found to sustain an intraoperative dural tear. Patient placed on bedrest and cleared for PT once approved by attending. Patient tolerated physical therapy and pain is controlled. Seen by medical attending for continuity of care and management and cleared for safe discharge. As per surgeon patient stable and ready for discharge.     Please follow up with  in 1-2 weeks. Call office to make an appointment. Please follow up with your PMD for continuity of care and management as medications may have changed. Do not take any NSAIDS (motrin, advil, ibuprofren, aleve), Aspirin, Antiinflammatory medications unless instructed by your orthopaedic surgeon to continue. Avoid any heavy lifting, bending, squatting, twisting motion. Keep dressing/incision clean, dry, intact. Any suture/staples to be removed on post op day # 14 at office visit. Weight bear as tolerated. Please follow up with Dr Benavidez at office visit about resuming plaquenil medication.

## 2017-06-02 NOTE — DISCHARGE NOTE ADULT - MEDICATION SUMMARY - MEDICATIONS TO STOP TAKING
I will STOP taking the medications listed below when I get home from the hospital:    hydroxychloroquine 200 mg oral tablet  -- 1 tab(s) by mouth 2 times a day I will STOP taking the medications listed below when I get home from the hospital:    pantoprazole 40 mg oral granule, enteric coated  -- 1 each by mouth 2 times a day    Centrum Silver oral tablet  -- 1 tab(s) by mouth once a day LD 5/24/2017    Citral + D slow rel. 600 mg Aleksey 500 units vit D  -- 1 tab(s) by mouth once a day AM    hydroxychloroquine 200 mg oral tablet  -- 1 tab(s) by mouth 2 times a day    lidocaine patch  -- 700 milligram(s) by transdermal patch , As Needed    acetaminophen 325 mg oral tablet  -- 3 tab(s) by mouth 2 times a day, As Needed    oxycodone-acetaminophen 7.5mg-325mg oral tablet  -- 2 tab(s) by mouth once a day, As Needed

## 2017-06-02 NOTE — DISCHARGE NOTE ADULT - HOSPITAL COURSE
84 yo is s/p  above without any intraoperative complications.  Pt is weight bear as tolerated  doing well and stable for discharge home. call surgeon's office one week to make appt. D/C sutures/staples POD 14 Patient s/p L2-L5 lami . Patient tolerated the procedure well she sustained an intraoperative dural tear. Patient tolerated physical therapy well and pain is controlled. Seen by medical attending for continuity of care and management and cleared for safe discharge. As per surgeon patient stable and ready for discharge.     Please follow up with  in 1-2 weeks. Call office to make an appointment. Please follow up with your PMD for continuity of care and management as medications may have changed. Do not take any NSAIDS (motrin, advil, ibuprofren, aleve), Aspirin, Antiinflammatory medications unless instructed by your orthopaedic surgeon to continue. Avoid any heavy lifting, bending, squatting, twisting motion. Keep dressing/incision clean, dry, intact. Any suture/staples to be removed on post op day # 14 at office visit. Weight bear as tolerated. Patient s/p L2-L5 laminecotomy with Dr Benavidez on 6/1/17. Patient tolerated the procedure well, found to sustain an intraoperative dural tear. Patient placed on bedrest and cleared for PT once approved by attending. Patient tolerated physical therapy and pain is controlled. Seen by medical attending for continuity of care and management and cleared for safe discharge. As per surgeon patient stable and ready for discharge.     Please follow up with  in 1-2 weeks. Call office to make an appointment. Please follow up with your PMD for continuity of care and management as medications may have changed. Do not take any NSAIDS (motrin, advil, ibuprofren, aleve), Aspirin, Antiinflammatory medications unless instructed by your orthopaedic surgeon to continue. Avoid any heavy lifting, bending, squatting, twisting motion. Keep dressing/incision clean, dry, intact. Any suture/staples to be removed on post op day # 14 at office visit. Weight bear as tolerated. Patient s/p L2-L5 laminecotomy with Dr Benavidez on 6/1/17. Patient tolerated the procedure well, found to sustain an intraoperative dural tear. Patient placed on bedrest and cleared for PT once approved by attending. Patient tolerated physical therapy and pain is controlled. Seen by medical attending for continuity of care and management and cleared for safe discharge. As per surgeon patient stable and ready for discharge.     Please follow up with  in 1-2 weeks. Call office to make an appointment. Please follow up with your PMD for continuity of care and management as medications may have changed. Do not take any NSAIDS (motrin, advil, ibuprofren, aleve), Aspirin, Antiinflammatory medications unless instructed by your orthopaedic surgeon to continue. Avoid any heavy lifting, bending, squatting, twisting motion. Keep dressing/incision clean, dry, intact. Any suture/staples to be removed on post op day # 14 at office visit. Weight bear as tolerated. Please follow up with Dr Benavidez at office visit about resuming plaquenil medication.

## 2017-06-02 NOTE — DISCHARGE NOTE ADULT - MEDICATION SUMMARY - MEDICATIONS TO CHANGE
I will SWITCH the dose or number of times a day I take the medications listed below when I get home from the hospital:  None I will SWITCH the dose or number of times a day I take the medications listed below when I get home from the hospital:    gabapentin 100 mg oral capsule  -- 2 cap(s) by mouth once a day (at bedtime)

## 2017-06-03 LAB
BUN SERPL-MCNC: 12 MG/DL — SIGNIFICANT CHANGE UP (ref 7–23)
CALCIUM SERPL-MCNC: 9 MG/DL — SIGNIFICANT CHANGE UP (ref 8.4–10.5)
CHLORIDE SERPL-SCNC: 110 MMOL/L — HIGH (ref 98–107)
CO2 SERPL-SCNC: 24 MMOL/L — SIGNIFICANT CHANGE UP (ref 22–31)
CREAT SERPL-MCNC: 0.85 MG/DL — SIGNIFICANT CHANGE UP (ref 0.5–1.3)
GLUCOSE SERPL-MCNC: 124 MG/DL — HIGH (ref 70–99)
POTASSIUM SERPL-MCNC: 4.8 MMOL/L — SIGNIFICANT CHANGE UP (ref 3.5–5.3)
POTASSIUM SERPL-SCNC: 4.8 MMOL/L — SIGNIFICANT CHANGE UP (ref 3.5–5.3)
SODIUM SERPL-SCNC: 145 MMOL/L — SIGNIFICANT CHANGE UP (ref 135–145)

## 2017-06-03 PROCEDURE — 99233 SBSQ HOSP IP/OBS HIGH 50: CPT

## 2017-06-03 RX ADMIN — Medication 200 MILLIGRAM(S): at 07:19

## 2017-06-03 RX ADMIN — OXYCODONE HYDROCHLORIDE 10 MILLIGRAM(S): 5 TABLET ORAL at 11:29

## 2017-06-03 RX ADMIN — VALSARTAN 160 MILLIGRAM(S): 80 TABLET ORAL at 05:27

## 2017-06-03 RX ADMIN — OXYCODONE HYDROCHLORIDE 10 MILLIGRAM(S): 5 TABLET ORAL at 17:48

## 2017-06-03 RX ADMIN — DULOXETINE HYDROCHLORIDE 60 MILLIGRAM(S): 30 CAPSULE, DELAYED RELEASE ORAL at 11:28

## 2017-06-03 RX ADMIN — Medication 88 MICROGRAM(S): at 05:27

## 2017-06-03 RX ADMIN — Medication 650 MILLIGRAM(S): at 05:27

## 2017-06-03 RX ADMIN — HYDROMORPHONE HYDROCHLORIDE 1 MILLIGRAM(S): 2 INJECTION INTRAMUSCULAR; INTRAVENOUS; SUBCUTANEOUS at 05:07

## 2017-06-03 RX ADMIN — GABAPENTIN 200 MILLIGRAM(S): 400 CAPSULE ORAL at 21:36

## 2017-06-03 RX ADMIN — POLYETHYLENE GLYCOL 3350 17 GRAM(S): 17 POWDER, FOR SOLUTION ORAL at 11:29

## 2017-06-03 RX ADMIN — HYDROMORPHONE HYDROCHLORIDE 1 MILLIGRAM(S): 2 INJECTION INTRAMUSCULAR; INTRAVENOUS; SUBCUTANEOUS at 05:22

## 2017-06-03 RX ADMIN — FAMOTIDINE 20 MILLIGRAM(S): 10 INJECTION INTRAVENOUS at 11:29

## 2017-06-03 RX ADMIN — OXYCODONE HYDROCHLORIDE 10 MILLIGRAM(S): 5 TABLET ORAL at 17:17

## 2017-06-03 RX ADMIN — CYCLOBENZAPRINE HYDROCHLORIDE 5 MILLIGRAM(S): 10 TABLET, FILM COATED ORAL at 18:40

## 2017-06-03 RX ADMIN — Medication 2 SPRAY(S): at 11:30

## 2017-06-03 RX ADMIN — Medication 650 MILLIGRAM(S): at 17:18

## 2017-06-03 RX ADMIN — SODIUM CHLORIDE 75 MILLILITER(S): 9 INJECTION INTRAMUSCULAR; INTRAVENOUS; SUBCUTANEOUS at 18:42

## 2017-06-03 RX ADMIN — Medication 650 MILLIGRAM(S): at 11:27

## 2017-06-03 RX ADMIN — Medication 100 MILLIGRAM(S): at 05:27

## 2017-06-03 RX ADMIN — Medication 100 MILLIGRAM(S): at 21:36

## 2017-06-03 RX ADMIN — SODIUM CHLORIDE 75 MILLILITER(S): 9 INJECTION INTRAMUSCULAR; INTRAVENOUS; SUBCUTANEOUS at 11:28

## 2017-06-03 RX ADMIN — SENNA PLUS 2 TABLET(S): 8.6 TABLET ORAL at 21:35

## 2017-06-03 RX ADMIN — Medication 200 MILLIGRAM(S): at 17:17

## 2017-06-03 RX ADMIN — Medication 5 MILLIGRAM(S): at 07:19

## 2017-06-03 RX ADMIN — OXYCODONE HYDROCHLORIDE 10 MILLIGRAM(S): 5 TABLET ORAL at 11:57

## 2017-06-03 RX ADMIN — ENOXAPARIN SODIUM 30 MILLIGRAM(S): 100 INJECTION SUBCUTANEOUS at 05:27

## 2017-06-03 NOTE — PROGRESS NOTE ADULT - ASSESSMENT
83F spinal stenosis s/p L2-5 laminectomy complicated by dural tear, acute post-op anemia, leukocytosis, HTN, hypothyroid, RA, GERD. 83F h/o HTN, hypothyroidism, RA, GERD, DVT x 2 s/p IVC filter, and spinal stenosis with neurogenic claudication s/p L2-5 laminectomy complicated by dural tear, now POD #2, c/b acute post-op anemia.

## 2017-06-03 NOTE — PHYSICAL THERAPY INITIAL EVALUATION ADULT - MD ORDER
Revision laminectomy, L2, L3, L4, and L5; L2, L3, L4, L5, and origin of S1 decompression with a dural repair with a patch.

## 2017-06-03 NOTE — PHYSICAL THERAPY INITIAL EVALUATION ADULT - ADDITIONAL COMMENTS
Pt reports that she lives in house with 3 steps to enter, with daughter. Pt reports that she ambulated without assistive devices.

## 2017-06-03 NOTE — PROGRESS NOTE ADULT - PROBLEM SELECTOR PROBLEM 7
Rheumatoid arthritis, involving unspecified site, unspecified rheumatoid factor presence Acquired hypothyroidism

## 2017-06-03 NOTE — OCCUPATIONAL THERAPY INITIAL EVALUATION ADULT - LIVES WITH, PROFILE
Pt lives with daughter in house with steps to manage. Pt has bathtub with grab bars and shower chair.

## 2017-06-03 NOTE — PROGRESS NOTE ADULT - PROBLEM SELECTOR PROBLEM 5
Leukocytosis, unspecified type Rheumatoid arthritis, involving unspecified site, unspecified rheumatoid factor presence

## 2017-06-03 NOTE — PROGRESS NOTE ADULT - SUBJECTIVE AND OBJECTIVE BOX
No acute events overnight. Pain well controlled with pain medications. Pt has been flat in bed for dural repair      AF, VSS    Exam:  Gen: NAD  Motor: 5/5 EHL/FHL/TA/Gastrocnemius  Sensory: SILT DP/SP/S/S/T nerve distributions  Vascular: 2+ Dorsalis Pedis pulse  Dressing: Clean, Dry, Intact    A/P: 83 year old F s/p L2-5 lami, dural repair    - Pain Control  - Regular Diet  - PT  - Will raise head of bed this afternoon

## 2017-06-03 NOTE — PROGRESS NOTE ADULT - PROBLEM SELECTOR PLAN 1
- on strict bedrest due to dural tear  - continue pain control per ortho protocol  - incentive spirometry

## 2017-06-03 NOTE — PROGRESS NOTE ADULT - PROBLEM SELECTOR PROBLEM 6
HTN (hypertension) Deep vein thrombosis (DVT) of lower extremity, unspecified chronicity, unspecified laterality, unspecified vein

## 2017-06-03 NOTE — PROGRESS NOTE ADULT - SUBJECTIVE AND OBJECTIVE BOX
Chief complaint: L2-5 lami 6/1/17        SUBJECTIVE / OVERNIGHT EVENTS:  low grade temp    MEDICATIONS  (STANDING):  sodium chloride 0.9%. 1000milliLiter(s) IV Continuous    docusate sodium 100milliGRAM(s) Oral three times a day  senna 2Tablet(s) Oral at bedtime  valsartan 160milliGRAM(s) Oral daily  DULoxetine 60milliGRAM(s) Oral daily  fluticasone propionate 50 MICROgram(s)/spray Nasal Spray 2Spray(s) Both Nostrils daily  levothyroxine 88MICROGram(s) Oral daily  gabapentin 200milliGRAM(s) Oral at bedtime  hydroxychloroquine 200milliGRAM(s) Oral two times a day with meals  famotidine    Tablet 20milliGRAM(s) Oral daily  polyethylene glycol 3350 17Gram(s) Oral daily  acetaminophen   Tablet. 650milliGRAM(s) Oral every 6 hours  predniSONE   Tablet 5milliGRAM(s) Oral daily    MEDICATIONS  (PRN):  acetaminophen   Tablet 650milliGRAM(s) Oral every 6 hours PRN For Temp greater than 38 C (100.4 F)  diphenhydrAMINE   Injectable 12.5milliGRAM(s) IV Push every 4 hours PRN Itching  aluminum hydroxide/magnesium hydroxide/simethicone Suspension 30milliLiter(s) Oral every 12 hours PRN Indigestion  ondansetron Injectable 4milliGRAM(s) IV Push every 6 hours PRN Nausea  fluocinonide 0.05% Cream 1Application(s) Topical three times a day PRN home med  oxyCODONE IR 5milliGRAM(s) Oral every 4 hours PRN Moderate Pain (4 - 6)  oxyCODONE IR 10milliGRAM(s) Oral every 4 hours PRN Severe Pain (7 - 10)  morphine  - Injectable 2milliGRAM(s) IV Push every 4 hours PRN Breakthrough Pain  oxyCODONE IR 2.5milliGRAM(s) Oral every 4 hours PRN Mild Pain (1 - 3)  cyclobenzaprine 5milliGRAM(s) Oral three times a day PRN Muscle Spasm  HYDROmorphone  Injectable 1milliGRAM(s) IV Push every 3 hours PRN breakthrough      Vital Signs:  T(C): 36.8, Max: 37.8    HR: 96 (91 - 112)  BP: 117/55 (117/45 - 119/53)  RR: 17    SpO2: 96% on RA    PHYSICAL EXAM:  GENERAL: NAD, well-developed  HEAD:  Atraumatic, Normocephalic  EYES: EOMI, PERRLA, conjunctiva and sclera clear  NECK: Supple, No JVD  CHEST/LUNG: Clear to auscultation bilaterally; No wheeze  HEART: Regular rate and rhythm; No murmurs, rubs, or gallops  ABDOMEN: Soft, Nontender, Nondistended; Bowel sounds present  EXTREMITIES:  2+ Peripheral Pulses, No clubbing, cyanosis, or edema  PSYCH: AAOx3  NEUROLOGY: non-focal  SKIN: No rashes or lesions      LABS:             145  |  110 |  12  ----------------------------<  124  4.8   |  24  |  0.85    Ca    9.0                     RADIOLOGY & ADDITIONAL TESTS:    Imaging Personally Reviewed:    Consultant(s) Notes Reviewed:      Care Discussed with Consultants/Other Providers: Chief complaint: L2-5 lami 6/1/17        SUBJECTIVE / OVERNIGHT EVENTS:  pt had low grade temp; severe pain in lower back incision site.      MEDICATIONS  (STANDING):  sodium chloride 0.9%. 1000milliLiter(s) IV Continuous    docusate sodium 100milliGRAM(s) Oral three times a day  senna 2Tablet(s) Oral at bedtime  valsartan 160milliGRAM(s) Oral daily  DULoxetine 60milliGRAM(s) Oral daily  fluticasone propionate 50 MICROgram(s)/spray Nasal Spray 2Spray(s) Both Nostrils daily  levothyroxine 88MICROGram(s) Oral daily  gabapentin 200milliGRAM(s) Oral at bedtime  hydroxychloroquine 200milliGRAM(s) Oral two times a day with meals  famotidine    Tablet 20milliGRAM(s) Oral daily  polyethylene glycol 3350 17Gram(s) Oral daily  acetaminophen   Tablet. 650milliGRAM(s) Oral every 6 hours  predniSONE   Tablet 5milliGRAM(s) Oral daily    MEDICATIONS  (PRN):  acetaminophen   Tablet 650milliGRAM(s) Oral every 6 hours PRN For Temp greater than 38 C (100.4 F)  diphenhydrAMINE   Injectable 12.5milliGRAM(s) IV Push every 4 hours PRN Itching  aluminum hydroxide/magnesium hydroxide/simethicone Suspension 30milliLiter(s) Oral every 12 hours PRN Indigestion  ondansetron Injectable 4milliGRAM(s) IV Push every 6 hours PRN Nausea  fluocinonide 0.05% Cream 1Application(s) Topical three times a day PRN home med  oxyCODONE IR 5milliGRAM(s) Oral every 4 hours PRN Moderate Pain (4 - 6)  oxyCODONE IR 10milliGRAM(s) Oral every 4 hours PRN Severe Pain (7 - 10)  morphine  - Injectable 2milliGRAM(s) IV Push every 4 hours PRN Breakthrough Pain  oxyCODONE IR 2.5milliGRAM(s) Oral every 4 hours PRN Mild Pain (1 - 3)  cyclobenzaprine 5milliGRAM(s) Oral three times a day PRN Muscle Spasm  HYDROmorphone  Injectable 1milliGRAM(s) IV Push every 3 hours PRN breakthrough      Vital Signs:  T(C): 36.8, Max: 37.8    HR: 96 (91 - 112)  BP: 117/55 (117/45 - 119/53)  RR: 17    SpO2: 96% on RA    PHYSICAL EXAM:  GENERAL: distress secondary to pain  HEENT:  at/nc, conjunctiva and sclera clear, neck supple  CHEST/LUNG: Clear to auscultation bilaterally; No wheeze  HEART: tachy  ABDOMEN: Soft, Nontender, Nondistended; Bowel sounds present  : Reid cath in place  BACK: dressing in place c/d/i  EXTREMITIES:  2+ Peripheral Pulses, No clubbing, cyanosis, or edema  PSYCH: AAOx3  NEUROLOGY: non-focal  SKIN: No rashes or lesions      LABS:             145  |  110 |  12  ----------------------------<  124  4.8   |  24  |  0.85    Ca    9.0               Care Discussed with Orthopedist

## 2017-06-03 NOTE — OCCUPATIONAL THERAPY INITIAL EVALUATION ADULT - PERTINENT HX OF CURRENT PROBLEM, REHAB EVAL
83 yr old F who c/o of chronic lower back pain for many years with Hx of prior [id number]. Pt c/o of pain 10/10 - relieved partially with pain medication that she uses inconsistently. Pt has hx of RA and OA and on low dose Prednisone. Hx of DVT x2 in 2009 post-op surgery and has IVC filter in place. Now s/p L3-4, L5-S1 Lumbar Laminectomy

## 2017-06-03 NOTE — PROGRESS NOTE ADULT - PROBLEM SELECTOR PROBLEM 8
Deep vein thrombosis (DVT) of lower extremity, unspecified chronicity, unspecified laterality, unspecified vein Reflux esophagitis

## 2017-06-04 LAB
BASOPHILS # BLD AUTO: 0.02 K/UL — SIGNIFICANT CHANGE UP (ref 0–0.2)
BASOPHILS NFR BLD AUTO: 0.2 % — SIGNIFICANT CHANGE UP (ref 0–2)
BUN SERPL-MCNC: 18 MG/DL — SIGNIFICANT CHANGE UP (ref 7–23)
CALCIUM SERPL-MCNC: 8.9 MG/DL — SIGNIFICANT CHANGE UP (ref 8.4–10.5)
CHLORIDE SERPL-SCNC: 106 MMOL/L — SIGNIFICANT CHANGE UP (ref 98–107)
CO2 SERPL-SCNC: 24 MMOL/L — SIGNIFICANT CHANGE UP (ref 22–31)
CREAT SERPL-MCNC: 0.76 MG/DL — SIGNIFICANT CHANGE UP (ref 0.5–1.3)
EOSINOPHIL # BLD AUTO: 0.11 K/UL — SIGNIFICANT CHANGE UP (ref 0–0.5)
EOSINOPHIL NFR BLD AUTO: 0.8 % — SIGNIFICANT CHANGE UP (ref 0–6)
GLUCOSE SERPL-MCNC: 103 MG/DL — HIGH (ref 70–99)
HCT VFR BLD CALC: 25 % — LOW (ref 34.5–45)
HGB BLD-MCNC: 8.2 G/DL — LOW (ref 11.5–15.5)
IMM GRANULOCYTES NFR BLD AUTO: 0.2 % — SIGNIFICANT CHANGE UP (ref 0–1.5)
LYMPHOCYTES # BLD AUTO: 0.71 K/UL — LOW (ref 1–3.3)
LYMPHOCYTES # BLD AUTO: 5.4 % — LOW (ref 13–44)
MCHC RBC-ENTMCNC: 30.8 PG — SIGNIFICANT CHANGE UP (ref 27–34)
MCHC RBC-ENTMCNC: 32.8 % — SIGNIFICANT CHANGE UP (ref 32–36)
MCV RBC AUTO: 94 FL — SIGNIFICANT CHANGE UP (ref 80–100)
MONOCYTES # BLD AUTO: 1.26 K/UL — HIGH (ref 0–0.9)
MONOCYTES NFR BLD AUTO: 9.6 % — SIGNIFICANT CHANGE UP (ref 2–14)
NEUTROPHILS # BLD AUTO: 10.94 K/UL — HIGH (ref 1.8–7.4)
NEUTROPHILS NFR BLD AUTO: 83.8 % — HIGH (ref 43–77)
PLATELET # BLD AUTO: 250 K/UL — SIGNIFICANT CHANGE UP (ref 150–400)
PMV BLD: 9.6 FL — SIGNIFICANT CHANGE UP (ref 7–13)
POTASSIUM SERPL-MCNC: 3.9 MMOL/L — SIGNIFICANT CHANGE UP (ref 3.5–5.3)
POTASSIUM SERPL-SCNC: 3.9 MMOL/L — SIGNIFICANT CHANGE UP (ref 3.5–5.3)
RBC # BLD: 2.66 M/UL — LOW (ref 3.8–5.2)
RBC # FLD: 12.5 % — SIGNIFICANT CHANGE UP (ref 10.3–14.5)
SODIUM SERPL-SCNC: 142 MMOL/L — SIGNIFICANT CHANGE UP (ref 135–145)
WBC # BLD: 13.07 K/UL — HIGH (ref 3.8–10.5)
WBC # FLD AUTO: 13.07 K/UL — HIGH (ref 3.8–10.5)

## 2017-06-04 PROCEDURE — 99233 SBSQ HOSP IP/OBS HIGH 50: CPT

## 2017-06-04 RX ORDER — ALBUTEROL 90 UG/1
2 AEROSOL, METERED ORAL EVERY 6 HOURS
Qty: 0 | Refills: 0 | Status: DISCONTINUED | OUTPATIENT
Start: 2017-06-04 | End: 2017-06-06

## 2017-06-04 RX ADMIN — Medication 5 MILLIGRAM(S): at 05:07

## 2017-06-04 RX ADMIN — Medication 200 MILLIGRAM(S): at 17:57

## 2017-06-04 RX ADMIN — OXYCODONE HYDROCHLORIDE 10 MILLIGRAM(S): 5 TABLET ORAL at 14:33

## 2017-06-04 RX ADMIN — Medication 100 MILLIGRAM(S): at 13:39

## 2017-06-04 RX ADMIN — FAMOTIDINE 20 MILLIGRAM(S): 10 INJECTION INTRAVENOUS at 13:38

## 2017-06-04 RX ADMIN — OXYCODONE HYDROCHLORIDE 10 MILLIGRAM(S): 5 TABLET ORAL at 09:50

## 2017-06-04 RX ADMIN — Medication 100 MILLIGRAM(S): at 21:38

## 2017-06-04 RX ADMIN — Medication 100 MILLIGRAM(S): at 05:07

## 2017-06-04 RX ADMIN — Medication 650 MILLIGRAM(S): at 05:07

## 2017-06-04 RX ADMIN — Medication 200 MILLIGRAM(S): at 07:14

## 2017-06-04 RX ADMIN — Medication 88 MICROGRAM(S): at 05:07

## 2017-06-04 RX ADMIN — OXYCODONE HYDROCHLORIDE 10 MILLIGRAM(S): 5 TABLET ORAL at 15:29

## 2017-06-04 RX ADMIN — SENNA PLUS 2 TABLET(S): 8.6 TABLET ORAL at 21:39

## 2017-06-04 RX ADMIN — GABAPENTIN 200 MILLIGRAM(S): 400 CAPSULE ORAL at 21:39

## 2017-06-04 RX ADMIN — CYCLOBENZAPRINE HYDROCHLORIDE 5 MILLIGRAM(S): 10 TABLET, FILM COATED ORAL at 15:28

## 2017-06-04 RX ADMIN — OXYCODONE HYDROCHLORIDE 10 MILLIGRAM(S): 5 TABLET ORAL at 05:07

## 2017-06-04 RX ADMIN — POLYETHYLENE GLYCOL 3350 17 GRAM(S): 17 POWDER, FOR SOLUTION ORAL at 13:39

## 2017-06-04 RX ADMIN — OXYCODONE HYDROCHLORIDE 10 MILLIGRAM(S): 5 TABLET ORAL at 10:37

## 2017-06-04 RX ADMIN — DULOXETINE HYDROCHLORIDE 60 MILLIGRAM(S): 30 CAPSULE, DELAYED RELEASE ORAL at 13:38

## 2017-06-04 RX ADMIN — OXYCODONE HYDROCHLORIDE 10 MILLIGRAM(S): 5 TABLET ORAL at 06:00

## 2017-06-04 RX ADMIN — VALSARTAN 160 MILLIGRAM(S): 80 TABLET ORAL at 05:07

## 2017-06-04 RX ADMIN — Medication 2 SPRAY(S): at 13:39

## 2017-06-04 RX ADMIN — SODIUM CHLORIDE 75 MILLILITER(S): 9 INJECTION INTRAMUSCULAR; INTRAVENOUS; SUBCUTANEOUS at 13:41

## 2017-06-04 NOTE — PROGRESS NOTE ADULT - ASSESSMENT
83F h/o HTN, hypothyroidism, RA, GERD, DVT x 2 s/p IVC filter, and spinal stenosis with neurogenic claudication s/p L2-5 laminectomy complicated by dural tear, now POD #4, c/b acute post-op anemia.

## 2017-06-04 NOTE — PROGRESS NOTE ADULT - SUBJECTIVE AND OBJECTIVE BOX
No acute events overnight. Pain well controlled with pain medications. Pt has been flat in bed for dural repair    Vital Signs Last 24 Hrs  T(C): 36.9, Max: 37.2 (06-03 @ 18:10)  T(F): 98.4, Max: 99 (06-03 @ 18:10)  HR: 98 (95 - 101)  BP: 139/61 (117/55 - 141/47)  BP(mean): --  RR: 18 (17 - 18)  SpO2: 96% (93% - 96%)    Exam:  Gen: NAD  Motor: 5/5 EHL/FHL/TA/Gastrocnemius  Sensory: SILT DP/SP/S/S/T nerve distributions  Vascular: 2+ Dorsalis Pedis pulse  Dressing: Clean, Dry, Intact    A/P: 83 year old F s/p L2-5 lami, dural repair    - Pain Control  - Regular Diet  - PT  - Pt may be out of bed today

## 2017-06-04 NOTE — PROGRESS NOTE ADULT - SUBJECTIVE AND OBJECTIVE BOX
CC: follow up for dural tear    SUBJECTIVE / OVERNIGHT EVENTS:  Tolerated OOB well this morning.  Pain is well controlled with pain medication.  No F/C, N/V, CP, SOB, Cough, lightheadedness, dizziness, abdominal pain, diarrhea, dysuria.    MEDICATIONS  (STANDING):  sodium chloride 0.9%. 1000milliLiter(s) IV Continuous <Continuous>  docusate sodium 100milliGRAM(s) Oral three times a day  senna 2Tablet(s) Oral at bedtime  valsartan 160milliGRAM(s) Oral daily  DULoxetine 60milliGRAM(s) Oral daily  fluticasone propionate 50 MICROgram(s)/spray Nasal Spray 2Spray(s) Both Nostrils daily  levothyroxine 88MICROGram(s) Oral daily  gabapentin 200milliGRAM(s) Oral at bedtime  hydroxychloroquine 200milliGRAM(s) Oral two times a day with meals  famotidine    Tablet 20milliGRAM(s) Oral daily  polyethylene glycol 3350 17Gram(s) Oral daily  predniSONE   Tablet 5milliGRAM(s) Oral daily    MEDICATIONS  (PRN):  acetaminophen   Tablet 650milliGRAM(s) Oral every 6 hours PRN For Temp greater than 38 C (100.4 F)  diphenhydrAMINE   Injectable 12.5milliGRAM(s) IV Push every 4 hours PRN Itching  aluminum hydroxide/magnesium hydroxide/simethicone Suspension 30milliLiter(s) Oral every 12 hours PRN Indigestion  ondansetron Injectable 4milliGRAM(s) IV Push every 6 hours PRN Nausea  fluocinonide 0.05% Cream 1Application(s) Topical three times a day PRN home med  oxyCODONE IR 5milliGRAM(s) Oral every 4 hours PRN Moderate Pain (4 - 6)  oxyCODONE IR 10milliGRAM(s) Oral every 4 hours PRN Severe Pain (7 - 10)  morphine  - Injectable 2milliGRAM(s) IV Push every 4 hours PRN Breakthrough Pain  oxyCODONE IR 2.5milliGRAM(s) Oral every 4 hours PRN Mild Pain (1 - 3)  cyclobenzaprine 5milliGRAM(s) Oral three times a day PRN Muscle Spasm  HYDROmorphone  Injectable 1milliGRAM(s) IV Push every 3 hours PRN breakthrough      Vital Signs Last 24 Hrs  T(C): 36.8, Max: 37.2 (06-03 @ 18:10)  HR: 89 (89 - 101)  BP: 110/53 (110/53 - 141/47)  RR: 17 (17 - 18)  SpO2: 96% (93% - 98%)  Wt(kg): --  CAPILLARY BLOOD GLUCOSE    I&O's Summary  I & Os for 24h ending 04 Jun 2017 07:00  =============================================  IN: 900 ml / OUT: 1295 ml / NET: -395 ml    I & Os for current day (as of 04 Jun 2017 14:48)  =============================================  IN: 0 ml / OUT: 250 ml / NET: -250 ml      PHYSICAL EXAM:  GENERAL: NAD, well-developed  HEAD:  Atraumatic, Normocephalic  EYES: EOMI, PERRLA, conjunctiva and sclera clear  NECK: Supple, No JVD  CHEST/LUNG: Clear to auscultation bilaterally; No wheeze  HEART: Regular rate and rhythm; No murmurs, rubs, or gallops  ABDOMEN: Soft, Nontender, Nondistended; Bowel sounds present  BACK: Mildly tender, ROM limited due to pain.  EXTREMITIES:  2+ Peripheral Pulses, No clubbing, cyanosis.  PSYCH: Calm  NEUROLOGY: AAOx3, non-focal neurological exam  SKIN: Laminectomy surgical dressing C/D/I    LABS:                        8.2    13.07 )-----------( 250      ( 04 Jun 2017 06:00 )             25.0     06-04    142  |  106  |  18  ----------------------------<  103<H>  3.9   |  24  |  0.76    Ca    8.9      04 Jun 2017 06:00        Care Discussed with primary team.

## 2017-06-04 NOTE — PROGRESS NOTE ADULT - PROBLEM SELECTOR PLAN 1
- OOB allowed - complicated by dural tear  - continue pain control per ortho protocol  - incentive spirometry

## 2017-06-05 LAB
BUN SERPL-MCNC: 20 MG/DL — SIGNIFICANT CHANGE UP (ref 7–23)
CALCIUM SERPL-MCNC: 9 MG/DL — SIGNIFICANT CHANGE UP (ref 8.4–10.5)
CHLORIDE SERPL-SCNC: 105 MMOL/L — SIGNIFICANT CHANGE UP (ref 98–107)
CO2 SERPL-SCNC: 25 MMOL/L — SIGNIFICANT CHANGE UP (ref 22–31)
CREAT SERPL-MCNC: 0.83 MG/DL — SIGNIFICANT CHANGE UP (ref 0.5–1.3)
GLUCOSE SERPL-MCNC: 102 MG/DL — HIGH (ref 70–99)
HCT VFR BLD CALC: 25.9 % — LOW (ref 34.5–45)
HGB BLD-MCNC: 8.1 G/DL — LOW (ref 11.5–15.5)
MCHC RBC-ENTMCNC: 29.7 PG — SIGNIFICANT CHANGE UP (ref 27–34)
MCHC RBC-ENTMCNC: 31.3 % — LOW (ref 32–36)
MCV RBC AUTO: 94.9 FL — SIGNIFICANT CHANGE UP (ref 80–100)
PLATELET # BLD AUTO: 306 K/UL — SIGNIFICANT CHANGE UP (ref 150–400)
PMV BLD: 9.3 FL — SIGNIFICANT CHANGE UP (ref 7–13)
POTASSIUM SERPL-MCNC: 3.8 MMOL/L — SIGNIFICANT CHANGE UP (ref 3.5–5.3)
POTASSIUM SERPL-SCNC: 3.8 MMOL/L — SIGNIFICANT CHANGE UP (ref 3.5–5.3)
RBC # BLD: 2.73 M/UL — LOW (ref 3.8–5.2)
RBC # FLD: 12.7 % — SIGNIFICANT CHANGE UP (ref 10.3–14.5)
SODIUM SERPL-SCNC: 142 MMOL/L — SIGNIFICANT CHANGE UP (ref 135–145)
WBC # BLD: 10.22 K/UL — SIGNIFICANT CHANGE UP (ref 3.8–10.5)
WBC # FLD AUTO: 10.22 K/UL — SIGNIFICANT CHANGE UP (ref 3.8–10.5)

## 2017-06-05 PROCEDURE — 99232 SBSQ HOSP IP/OBS MODERATE 35: CPT

## 2017-06-05 RX ORDER — POLYETHYLENE GLYCOL 3350 17 G/17G
17 POWDER, FOR SOLUTION ORAL
Qty: 0 | Refills: 0 | COMMUNITY
Start: 2017-06-05

## 2017-06-05 RX ORDER — GABAPENTIN 400 MG/1
2 CAPSULE ORAL
Qty: 0 | Refills: 0 | COMMUNITY

## 2017-06-05 RX ORDER — VALSARTAN 80 MG/1
1 TABLET ORAL
Qty: 0 | Refills: 0 | COMMUNITY

## 2017-06-05 RX ORDER — DOCUSATE SODIUM 100 MG
1 CAPSULE ORAL
Qty: 0 | Refills: 0 | COMMUNITY
Start: 2017-06-05

## 2017-06-05 RX ORDER — HYDROXYCHLOROQUINE SULFATE 200 MG
1 TABLET ORAL
Qty: 0 | Refills: 0 | COMMUNITY

## 2017-06-05 RX ORDER — VALSARTAN 80 MG/1
1 TABLET ORAL
Qty: 0 | Refills: 0 | COMMUNITY
Start: 2017-06-05

## 2017-06-05 RX ORDER — OXYCODONE HYDROCHLORIDE 5 MG/1
1 TABLET ORAL
Qty: 0 | Refills: 0 | COMMUNITY
Start: 2017-06-05

## 2017-06-05 RX ORDER — SODIUM CHLORIDE 9 MG/ML
1000 INJECTION INTRAMUSCULAR; INTRAVENOUS; SUBCUTANEOUS
Qty: 0 | Refills: 0 | Status: DISCONTINUED | OUTPATIENT
Start: 2017-06-05 | End: 2017-06-06

## 2017-06-05 RX ORDER — LEVOTHYROXINE SODIUM 125 MCG
1 TABLET ORAL
Qty: 0 | Refills: 0 | COMMUNITY

## 2017-06-05 RX ORDER — GABAPENTIN 400 MG/1
2 CAPSULE ORAL
Qty: 180 | Refills: 0 | OUTPATIENT
Start: 2017-06-05 | End: 2017-07-05

## 2017-06-05 RX ORDER — LEVOTHYROXINE SODIUM 125 MCG
1 TABLET ORAL
Qty: 0 | Refills: 0 | COMMUNITY
Start: 2017-06-05

## 2017-06-05 RX ORDER — DULOXETINE HYDROCHLORIDE 30 MG/1
1 CAPSULE, DELAYED RELEASE ORAL
Qty: 0 | Refills: 0 | COMMUNITY
Start: 2017-06-05

## 2017-06-05 RX ORDER — FLUOCINONIDE/EMOLLIENT BASE 0.05 %
1 CREAM (GRAM) TOPICAL
Qty: 0 | Refills: 0 | COMMUNITY
Start: 2017-06-05

## 2017-06-05 RX ORDER — DULOXETINE HYDROCHLORIDE 30 MG/1
1 CAPSULE, DELAYED RELEASE ORAL
Qty: 0 | Refills: 0 | COMMUNITY

## 2017-06-05 RX ORDER — FLUOCINONIDE/EMOLLIENT BASE 0.05 %
1 CREAM (GRAM) TOPICAL
Qty: 0 | Refills: 0 | COMMUNITY

## 2017-06-05 RX ORDER — OXYCODONE HYDROCHLORIDE 5 MG/1
1 TABLET ORAL
Qty: 42 | Refills: 0 | OUTPATIENT
Start: 2017-06-05 | End: 2017-06-12

## 2017-06-05 RX ORDER — GABAPENTIN 400 MG/1
2 CAPSULE ORAL
Qty: 0 | Refills: 0 | COMMUNITY
Start: 2017-06-05

## 2017-06-05 RX ORDER — ACETAMINOPHEN 500 MG
3 TABLET ORAL
Qty: 0 | Refills: 0 | COMMUNITY

## 2017-06-05 RX ORDER — PANTOPRAZOLE SODIUM 20 MG/1
1 TABLET, DELAYED RELEASE ORAL
Qty: 0 | Refills: 0 | COMMUNITY

## 2017-06-05 RX ADMIN — OXYCODONE HYDROCHLORIDE 10 MILLIGRAM(S): 5 TABLET ORAL at 21:18

## 2017-06-05 RX ADMIN — VALSARTAN 160 MILLIGRAM(S): 80 TABLET ORAL at 05:22

## 2017-06-05 RX ADMIN — Medication 100 MILLIGRAM(S): at 22:23

## 2017-06-05 RX ADMIN — OXYCODONE HYDROCHLORIDE 10 MILLIGRAM(S): 5 TABLET ORAL at 06:03

## 2017-06-05 RX ADMIN — GABAPENTIN 200 MILLIGRAM(S): 400 CAPSULE ORAL at 22:23

## 2017-06-05 RX ADMIN — POLYETHYLENE GLYCOL 3350 17 GRAM(S): 17 POWDER, FOR SOLUTION ORAL at 14:59

## 2017-06-05 RX ADMIN — DULOXETINE HYDROCHLORIDE 60 MILLIGRAM(S): 30 CAPSULE, DELAYED RELEASE ORAL at 14:59

## 2017-06-05 RX ADMIN — OXYCODONE HYDROCHLORIDE 10 MILLIGRAM(S): 5 TABLET ORAL at 09:27

## 2017-06-05 RX ADMIN — OXYCODONE HYDROCHLORIDE 10 MILLIGRAM(S): 5 TABLET ORAL at 10:27

## 2017-06-05 RX ADMIN — Medication 2 SPRAY(S): at 15:00

## 2017-06-05 RX ADMIN — Medication 100 MILLIGRAM(S): at 14:59

## 2017-06-05 RX ADMIN — OXYCODONE HYDROCHLORIDE 10 MILLIGRAM(S): 5 TABLET ORAL at 19:49

## 2017-06-05 RX ADMIN — Medication 88 MICROGRAM(S): at 05:22

## 2017-06-05 RX ADMIN — SODIUM CHLORIDE 100 MILLILITER(S): 9 INJECTION INTRAMUSCULAR; INTRAVENOUS; SUBCUTANEOUS at 19:49

## 2017-06-05 RX ADMIN — SENNA PLUS 2 TABLET(S): 8.6 TABLET ORAL at 22:23

## 2017-06-05 RX ADMIN — OXYCODONE HYDROCHLORIDE 10 MILLIGRAM(S): 5 TABLET ORAL at 00:37

## 2017-06-05 RX ADMIN — Medication 5 MILLIGRAM(S): at 05:22

## 2017-06-05 RX ADMIN — Medication 100 MILLIGRAM(S): at 05:22

## 2017-06-05 RX ADMIN — FAMOTIDINE 20 MILLIGRAM(S): 10 INJECTION INTRAVENOUS at 15:00

## 2017-06-05 RX ADMIN — OXYCODONE HYDROCHLORIDE 10 MILLIGRAM(S): 5 TABLET ORAL at 01:30

## 2017-06-05 RX ADMIN — OXYCODONE HYDROCHLORIDE 10 MILLIGRAM(S): 5 TABLET ORAL at 05:22

## 2017-06-05 NOTE — PROGRESS NOTE ADULT - PROBLEM SELECTOR PLAN 5
- continue Plaquenil and prednisone - continue prednisone; holding plaquenil per ortho in the post-op period.

## 2017-06-05 NOTE — PROGRESS NOTE ADULT - SUBJECTIVE AND OBJECTIVE BOX
Pain is currently well controlled with pain medication. No current chest pain, shortness of breath, lightheadedness or dizziness. No nausea or vomiting.    Exam:  Gen: NAD  Motor: 5/5 EHL/FHL/TA/Gastrocnemius  Sensory: SILT DP/SP/S/S/T nerve distributions  Vascular: 2+ Dorsalis Pedis pulse  Dressing: Clean, Dry, Intact    A/P: 83 year old F s/p L2-L5 laminectomy, intraop dural tear  - DC melendez catheter  - Pain Control  - Regular Diet  - Venodynes  - TLSO for comfort  - PT/OT, OOB  - WBAT  - Dispo: rehab

## 2017-06-05 NOTE — PROGRESS NOTE ADULT - PROBLEM SELECTOR PLAN 1
- Pain well controlled; continue management and pain control per ortho recs with oxycodone IR, dilaudid IV and morphine IV prn; f/u ortho recs.   - no longer on strict bedrest due to dural tear; OT/PT/OOB  - f/u PT recs  - continue pain control per ortho protocol  - incentive spirometry

## 2017-06-05 NOTE — PROGRESS NOTE ADULT - SUBJECTIVE AND OBJECTIVE BOX
CHIEF COMPLAINT: f/u back pain s/p L2-5 laminectomy.     SUBJECTIVE / OVERNIGHT EVENTS: Patient seen and examined. No acute events overnight. Pain well controlled and patient without any complaints. Ambulated yesterday with PT, still with melnedez catheter.     MEDICATIONS  (STANDING):  docusate sodium 100milliGRAM(s) Oral three times a day  senna 2Tablet(s) Oral at bedtime  valsartan 160milliGRAM(s) Oral daily  DULoxetine 60milliGRAM(s) Oral daily  fluticasone propionate 50 MICROgram(s)/spray Nasal Spray 2Spray(s) Both Nostrils daily  levothyroxine 88MICROGram(s) Oral daily  gabapentin 200milliGRAM(s) Oral at bedtime  famotidine    Tablet 20milliGRAM(s) Oral daily  polyethylene glycol 3350 17Gram(s) Oral daily  predniSONE   Tablet 5milliGRAM(s) Oral daily    MEDICATIONS  (PRN):  acetaminophen   Tablet 650milliGRAM(s) Oral every 6 hours PRN For Temp greater than 38 C (100.4 F)  diphenhydrAMINE   Injectable 12.5milliGRAM(s) IV Push every 4 hours PRN Itching  aluminum hydroxide/magnesium hydroxide/simethicone Suspension 30milliLiter(s) Oral every 12 hours PRN Indigestion  ondansetron Injectable 4milliGRAM(s) IV Push every 6 hours PRN Nausea  fluocinonide 0.05% Cream 1Application(s) Topical three times a day PRN home med  oxyCODONE IR 5milliGRAM(s) Oral every 4 hours PRN Moderate Pain (4 - 6)  oxyCODONE IR 10milliGRAM(s) Oral every 4 hours PRN Severe Pain (7 - 10)  morphine  - Injectable 2milliGRAM(s) IV Push every 4 hours PRN Breakthrough Pain  oxyCODONE IR 2.5milliGRAM(s) Oral every 4 hours PRN Mild Pain (1 - 3)  cyclobenzaprine 5milliGRAM(s) Oral three times a day PRN Muscle Spasm  HYDROmorphone  Injectable 1milliGRAM(s) IV Push every 3 hours PRN breakthrough  ALBUTerol    90 MICROgram(s) HFA Inhaler 2Puff(s) Inhalation every 6 hours PRN Bronchospasm  guaiFENesin   Syrup  (Sugar-Free) 100milliGRAM(s) Oral every 6 hours PRN Cough    VITALS:  T(F): 98.4, Max: 98.8 (06-05 @ 00:56)  HR: 99 (89 - 99)  BP: 143/62 (110/53 - 145/63)  RR: 18 (17 - 18)  SpO2: 96% (95% - 97%)  Wt(kg): --  Height (cm): 149.9 (10:41)  Weight (kg): 57.2 (15:45)  BMI (kg/m2): 25.5 (15:45)    PHYSICAL EXAM:  GENERAL: NAD, well-developed  CHEST/LUNG: Clear to auscultation bilaterally; No wheeze  HEART: Regular rate and rhythm; No murmurs, rubs, or gallops  ABDOMEN: Soft, Nontender, Nondistended; Bowel sounds present  EXTREMITIES:  2+ Peripheral Pulses, No clubbing, cyanosis, or edema    LABS:              8.1                              10.22 >-----------< 306                          25.9                     H/H trend  06-05 @ 05:36   HgB  8.1   HCT  25.9  06-04 @ 06:00   HgB  8.2   HCT  25.0  06-02 @ 05:30   HgB  9.1   HCT  29.0  06-01 @ 16:51   HgB  10.6  HCT  33.5    [ ] Consultant(s) Notes Reviewed:  [x] Care Discussed with Consultants/Other Providers: Ortho PA - discussed disposition.

## 2017-06-05 NOTE — PROGRESS NOTE ADULT - ASSESSMENT
83F h/o HTN, hypothyroidism, RA, GERD, DVT x 2 s/p IVC filter, and spinal stenosis with neurogenic claudication s/p L2-5 laminectomy complicated by dural tear c/b acute post-op anemia.

## 2017-06-06 VITALS
HEART RATE: 93 BPM | SYSTOLIC BLOOD PRESSURE: 135 MMHG | RESPIRATION RATE: 16 BRPM | TEMPERATURE: 98 F | OXYGEN SATURATION: 94 % | DIASTOLIC BLOOD PRESSURE: 57 MMHG

## 2017-06-06 PROCEDURE — 99232 SBSQ HOSP IP/OBS MODERATE 35: CPT

## 2017-06-06 RX ORDER — GABAPENTIN 400 MG/1
2 CAPSULE ORAL
Qty: 0 | Refills: 0 | COMMUNITY
Start: 2017-06-06

## 2017-06-06 RX ORDER — OXYCODONE HYDROCHLORIDE 5 MG/1
10 TABLET ORAL ONCE
Qty: 0 | Refills: 0 | Status: CANCELLED | OUTPATIENT
Start: 2017-06-10 | End: 2017-06-06

## 2017-06-06 RX ORDER — CYCLOBENZAPRINE HYDROCHLORIDE 10 MG/1
1 TABLET, FILM COATED ORAL
Qty: 0 | Refills: 0 | COMMUNITY
Start: 2017-06-06

## 2017-06-06 RX ORDER — OXYCODONE HYDROCHLORIDE 5 MG/1
2.5 TABLET ORAL
Qty: 0 | Refills: 0 | COMMUNITY
Start: 2017-06-06

## 2017-06-06 RX ORDER — OXYCODONE HYDROCHLORIDE 5 MG/1
1 TABLET ORAL
Qty: 0 | Refills: 0 | COMMUNITY
Start: 2017-06-06

## 2017-06-06 RX ORDER — FAMOTIDINE 10 MG/ML
1 INJECTION INTRAVENOUS
Qty: 0 | Refills: 0 | COMMUNITY
Start: 2017-06-06

## 2017-06-06 RX ORDER — ALBUTEROL 90 UG/1
2 AEROSOL, METERED ORAL
Qty: 0 | Refills: 0 | COMMUNITY
Start: 2017-06-06

## 2017-06-06 RX ORDER — MULTIVIT-MIN/FERROUS GLUCONATE 9 MG/15 ML
1 LIQUID (ML) ORAL
Qty: 0 | Refills: 0 | COMMUNITY

## 2017-06-06 RX ORDER — SENNA PLUS 8.6 MG/1
2 TABLET ORAL
Qty: 0 | Refills: 0 | COMMUNITY
Start: 2017-06-06

## 2017-06-06 RX ADMIN — OXYCODONE HYDROCHLORIDE 10 MILLIGRAM(S): 5 TABLET ORAL at 10:36

## 2017-06-06 RX ADMIN — Medication 100 MILLIGRAM(S): at 05:54

## 2017-06-06 RX ADMIN — DULOXETINE HYDROCHLORIDE 60 MILLIGRAM(S): 30 CAPSULE, DELAYED RELEASE ORAL at 12:32

## 2017-06-06 RX ADMIN — VALSARTAN 160 MILLIGRAM(S): 80 TABLET ORAL at 05:53

## 2017-06-06 RX ADMIN — Medication 5 MILLIGRAM(S): at 05:54

## 2017-06-06 RX ADMIN — Medication 2 SPRAY(S): at 12:32

## 2017-06-06 RX ADMIN — Medication 88 MICROGRAM(S): at 05:53

## 2017-06-06 RX ADMIN — FAMOTIDINE 20 MILLIGRAM(S): 10 INJECTION INTRAVENOUS at 12:32

## 2017-06-06 RX ADMIN — OXYCODONE HYDROCHLORIDE 10 MILLIGRAM(S): 5 TABLET ORAL at 14:07

## 2017-06-06 NOTE — PROGRESS NOTE ADULT - SUBJECTIVE AND OBJECTIVE BOX
Patient is seen and examined. Denies CP/SOB/Dizziness/N/V/D/HA. Pain is controlled.     Vital Signs Last 24 Hrs  T(C): 37, Max: 37.1 (06-05 @ 22:17)  T(F): 98.6, Max: 98.7 (06-05 @ 22:17)  HR: 89 (89 - 100)  BP: 125/52 (125/52 - 143/62)  BP(mean): --  RR: 16 (16 - 18)  SpO2: 93% (93% - 96%)    Gen: NAD    BACK:  Dressing C/D/I.   Compartments are soft, extremities are warm. Motor intact EHL/FHL/TA/GS. Sensation is grossly intact in the BL LE. 1+DP BL LE.     Labs:                           8.1    10.22 )-----------( 306      ( 05 Jun 2017 05:36 )             25.9     06-05    142  |  105  |  20  ----------------------------<  102<H>  3.8   |  25  |  0.83    Ca    9.0      05 Jun 2017 05:35

## 2017-06-06 NOTE — PROGRESS NOTE ADULT - PROBLEM SELECTOR PLAN 4
- continue valsartan with holding parameters

## 2017-06-06 NOTE — PROGRESS NOTE ADULT - PROBLEM SELECTOR PLAN 2
- monitor H/H. No indication for transfusion at this time.
- Hgb holding up at 8.0  - continue to monitor H/H  - no indication for transfusion at this time
- no indication for transfusion at this time
- monitor H/H

## 2017-06-06 NOTE — PROGRESS NOTE ADULT - SUBJECTIVE AND OBJECTIVE BOX
CHIEF COMPLAINT: f/u back pain s/p L2-5 laminectomy    SUBJECTIVE / OVERNIGHT EVENTS: Patient seen and examined. No acute events overnight. Pain well controlled and patient without any complaints.    MEDICATIONS  (STANDING):  docusate sodium 100milliGRAM(s) Oral three times a day  senna 2Tablet(s) Oral at bedtime  valsartan 160milliGRAM(s) Oral daily  DULoxetine 60milliGRAM(s) Oral daily  fluticasone propionate 50 MICROgram(s)/spray Nasal Spray 2Spray(s) Both Nostrils daily  levothyroxine 88MICROGram(s) Oral daily  gabapentin 200milliGRAM(s) Oral at bedtime  famotidine    Tablet 20milliGRAM(s) Oral daily  polyethylene glycol 3350 17Gram(s) Oral daily  predniSONE   Tablet 5milliGRAM(s) Oral daily  sodium chloride 0.9%. 1000milliLiter(s) IV Continuous <Continuous>    MEDICATIONS  (PRN):  acetaminophen   Tablet 650milliGRAM(s) Oral every 6 hours PRN For Temp greater than 38 C (100.4 F)  diphenhydrAMINE   Injectable 12.5milliGRAM(s) IV Push every 4 hours PRN Itching  aluminum hydroxide/magnesium hydroxide/simethicone Suspension 30milliLiter(s) Oral every 12 hours PRN Indigestion  ondansetron Injectable 4milliGRAM(s) IV Push every 6 hours PRN Nausea  fluocinonide 0.05% Cream 1Application(s) Topical three times a day PRN home med  oxyCODONE IR 5milliGRAM(s) Oral every 4 hours PRN Moderate Pain (4 - 6)  oxyCODONE IR 10milliGRAM(s) Oral every 4 hours PRN Severe Pain (7 - 10)  morphine  - Injectable 2milliGRAM(s) IV Push every 4 hours PRN Breakthrough Pain  oxyCODONE IR 2.5milliGRAM(s) Oral every 4 hours PRN Mild Pain (1 - 3)  cyclobenzaprine 5milliGRAM(s) Oral three times a day PRN Muscle Spasm  HYDROmorphone  Injectable 1milliGRAM(s) IV Push every 3 hours PRN breakthrough  ALBUTerol    90 MICROgram(s) HFA Inhaler 2Puff(s) Inhalation every 6 hours PRN Bronchospasm  guaiFENesin   Syrup  (Sugar-Free) 100milliGRAM(s) Oral every 6 hours PRN Cough    VITALS:  T(F): 98.4, Max: 98.7 (06-05 @ 22:17)  HR: 102 (89 - 102)  BP: 125/52 (125/52 - 139/63)  RR: 16 (16 - 18)  SpO2: 99% (93% - 99%)    Height (cm): 149.9 (10:41)  Weight (kg): 57.2 (15:45)  BMI (kg/m2): 25.5 (15:45)    PHYSICAL EXAM:  GENERAL: NAD, well-developed  HEAD:  Atraumatic, Normocephalic  EYES: EOMI, PERRLA, conjunctiva and sclera clear  NECK: Supple, No JVD  CHEST/LUNG: Clear to auscultation bilaterally; No wheeze  HEART: Regular rate and rhythm; No murmurs, rubs, or gallops  ABDOMEN: Soft, Nontender, Nondistended; Bowel sounds present  EXTREMITIES:  2+ Peripheral Pulses, No clubbing, cyanosis, or edema  PSYCH: AAOx3  NEUROLOGY: non-focal  SKIN: No rashes or lesions    LABS: no labs    [ ] Consultant(s) Notes Reviewed:  [x] Care Discussed with Consultants/Other Providers: Ortho PA - discussed disposition

## 2017-06-06 NOTE — PROGRESS NOTE ADULT - PROBLEM SELECTOR PLAN 1
-Pain control/Analgesia  -Inc Spirometry  -Venodynes  -F/U AM Labbs  -PT/OT  -WBAT  -Prednisone  -Notify Ortho with any questions

## 2017-06-06 NOTE — PROGRESS NOTE ADULT - PROBLEM SELECTOR PLAN 3
reactive and self-limiting, no s/s of infection, low grade temp due to atelectasis  - monitor WBC and temp curve
-leukocytosis resolved
-like reactive 2/2 post-op changes; no signs of infection at this time  -leukocytosis resolved
reactive and self-limiting, no s/s of infection, low grade temp due to atelectasis  - monitor WBC and temp curve

## 2017-06-06 NOTE — PROGRESS NOTE ADULT - PROVIDER SPECIALTY LIST ADULT
Anesthesia
Hospitalist
Orthopedics
Hospitalist

## 2017-06-06 NOTE — PROGRESS NOTE ADULT - PROBLEM SELECTOR PLAN 6
- high risk for recurrence - if ok with surgery, resume DVT ppx
-on teds/venodymes per Ortho protocol
-on teds/venodymes per Ortho protocol  -dispo per primary team
- high risk for recurrence - if ok with surgery, resume DVT ppx

## 2017-06-28 ENCOUNTER — APPOINTMENT (OUTPATIENT)
Dept: ORTHOPEDIC SURGERY | Facility: CLINIC | Age: 82
End: 2017-06-28

## 2017-06-28 VITALS
HEART RATE: 99 BPM | DIASTOLIC BLOOD PRESSURE: 71 MMHG | BODY MASS INDEX: 25.2 KG/M2 | HEIGHT: 59 IN | SYSTOLIC BLOOD PRESSURE: 157 MMHG | WEIGHT: 125 LBS

## 2017-08-23 ENCOUNTER — APPOINTMENT (OUTPATIENT)
Dept: ORTHOPEDIC SURGERY | Facility: CLINIC | Age: 82
End: 2017-08-23
Payer: MEDICARE

## 2017-08-23 VITALS
BODY MASS INDEX: 25.2 KG/M2 | DIASTOLIC BLOOD PRESSURE: 80 MMHG | SYSTOLIC BLOOD PRESSURE: 156 MMHG | HEART RATE: 94 BPM | WEIGHT: 125 LBS | HEIGHT: 59 IN

## 2017-08-23 DIAGNOSIS — M48.07 SPINAL STENOSIS, LUMBOSACRAL REGION: ICD-10-CM

## 2017-08-23 PROCEDURE — 99024 POSTOP FOLLOW-UP VISIT: CPT

## 2017-08-23 PROCEDURE — 72100 X-RAY EXAM L-S SPINE 2/3 VWS: CPT

## 2017-11-22 ENCOUNTER — APPOINTMENT (OUTPATIENT)
Dept: ORTHOPEDIC SURGERY | Facility: CLINIC | Age: 82
End: 2017-11-22
Payer: MEDICARE

## 2017-11-22 VITALS
WEIGHT: 125 LBS | DIASTOLIC BLOOD PRESSURE: 75 MMHG | HEART RATE: 101 BPM | SYSTOLIC BLOOD PRESSURE: 136 MMHG | BODY MASS INDEX: 25.2 KG/M2 | HEIGHT: 59 IN

## 2017-11-22 DIAGNOSIS — M54.5 LOW BACK PAIN: ICD-10-CM

## 2017-11-22 PROCEDURE — 99214 OFFICE O/P EST MOD 30 MIN: CPT | Mod: 25

## 2017-11-22 PROCEDURE — 20552 NJX 1/MLT TRIGGER POINT 1/2: CPT

## 2018-05-09 ENCOUNTER — APPOINTMENT (OUTPATIENT)
Dept: ORTHOPEDIC SURGERY | Facility: CLINIC | Age: 83
End: 2018-05-09
Payer: MEDICARE

## 2018-05-09 DIAGNOSIS — M51.36 OTHER INTERVERTEBRAL DISC DEGENERATION, LUMBAR REGION: ICD-10-CM

## 2018-05-09 PROCEDURE — 20552 NJX 1/MLT TRIGGER POINT 1/2: CPT

## 2018-05-09 PROCEDURE — 99214 OFFICE O/P EST MOD 30 MIN: CPT | Mod: 25

## 2018-05-09 PROCEDURE — 72100 X-RAY EXAM L-S SPINE 2/3 VWS: CPT

## 2018-07-10 NOTE — PACU DISCHARGE NOTE - HYDRATION STATUS:
Satisfactory Crescentic Intermediate Repair Preamble Text (Leave Blank If You Do Not Want): Undermining was performed with blunt dissection.

## 2019-08-28 PROBLEM — M06.9 RHEUMATOID ARTHRITIS, UNSPECIFIED: Chronic | Status: ACTIVE | Noted: 2017-05-22

## 2019-08-28 PROBLEM — I10 ESSENTIAL (PRIMARY) HYPERTENSION: Chronic | Status: ACTIVE | Noted: 2017-05-22

## 2019-08-28 PROBLEM — M81.0 AGE-RELATED OSTEOPOROSIS WITHOUT CURRENT PATHOLOGICAL FRACTURE: Chronic | Status: ACTIVE | Noted: 2017-05-22

## 2019-08-28 PROBLEM — M48.00 SPINAL STENOSIS, SITE UNSPECIFIED: Chronic | Status: ACTIVE | Noted: 2017-05-22

## 2019-08-28 PROBLEM — I82.409 ACUTE EMBOLISM AND THROMBOSIS OF UNSPECIFIED DEEP VEINS OF UNSPECIFIED LOWER EXTREMITY: Chronic | Status: ACTIVE | Noted: 2017-05-22

## 2019-08-28 PROBLEM — K21.0 GASTRO-ESOPHAGEAL REFLUX DISEASE WITH ESOPHAGITIS: Chronic | Status: ACTIVE | Noted: 2017-05-22

## 2019-08-28 PROBLEM — E03.9 HYPOTHYROIDISM, UNSPECIFIED: Chronic | Status: ACTIVE | Noted: 2017-05-22

## 2019-08-28 PROBLEM — H33.20 SEROUS RETINAL DETACHMENT, UNSPECIFIED EYE: Chronic | Status: ACTIVE | Noted: 2017-05-22

## 2019-08-28 PROBLEM — M19.90 UNSPECIFIED OSTEOARTHRITIS, UNSPECIFIED SITE: Chronic | Status: ACTIVE | Noted: 2017-05-22

## 2019-08-28 PROBLEM — K57.90 DIVERTICULOSIS OF INTESTINE, PART UNSPECIFIED, WITHOUT PERFORATION OR ABSCESS WITHOUT BLEEDING: Chronic | Status: ACTIVE | Noted: 2017-05-22

## 2019-09-13 ENCOUNTER — APPOINTMENT (OUTPATIENT)
Dept: ORTHOPEDIC SURGERY | Facility: CLINIC | Age: 84
End: 2019-09-13
Payer: MEDICARE

## 2019-09-13 DIAGNOSIS — M54.6 PAIN IN THORACIC SPINE: ICD-10-CM

## 2019-09-13 PROCEDURE — 99214 OFFICE O/P EST MOD 30 MIN: CPT

## 2019-09-13 PROCEDURE — 72070 X-RAY EXAM THORAC SPINE 2VWS: CPT

## 2019-09-13 NOTE — DISCUSSION/SUMMARY
[de-identified] : Severe arthritic changes throughout the lumbar junction.\par Postoperative physical therapy.\par 2 years 3 months status post revision lumbar laminectomy and doing great.\par We discussed all options.\par PT. \par All questions were answered, all alternatives discussed and the patient is in complete agreement with that plan. Follow-up appointment as instructed. Any issues and the patient will call or come in sooner.

## 2019-09-13 NOTE — ADDENDUM
[FreeTextEntry1] :  This note was authored by Erinn Taylor working as a medical scribe for Dr. Hudson Benavidez. The note was reviewed, edited, and revised by Dr. Hudson Benavidez whom is in agreement with the exam findings, imaging findings, and treatment plan. Sep 13, 2019

## 2019-09-13 NOTE — PHYSICAL EXAM
[UE/LE] : Motor: 5/5 motor strength in bilateral upper & lower extremities [ALL] : Biceps, brachioradialis, triceps, patellar, ankle and plantar 2+ and symmetric bilaterally [Normal] : Gait: normal [Poor Appearance] : well-appearing [Acute Distress] : not in acute distress [de-identified] : Incision is healed, 5 out of 5 motor strength, sensation is intact and symmetrical full range of motion flexion extension and rotation, no palpatory tenderness full range of motion of hips knees shoulders and elbows (all four extremities), no atrophy, negative straight leg raise, no pathological reflexes, no swelling, normal ambulation, no apparent distress skin is intact, no palpable lymph nodes, no upper or lower extremity instability, alert and oriented x3 and normal mood. Normal finger-to nose test. Pain over the right trochanteric region. [de-identified] : AP/lat lumbar 09/13/2019: severe arthritic changes through the thoracolumbar region - reviewed with patient and family member. \par \par AP/lat lumbar 05/09/2018 -Reveals a laminectomy performed with degenerative disc disease as well as a scoliosis and osteopenia-reviewed with the patient.\par \par

## 2019-09-13 NOTE — HISTORY OF PRESENT ILLNESS
[Improving] : improving [All Other ROS Normal] : All other review of systems are negative except as noted [Headache] : no headache [Dizziness] : no dizziness [Fainting] : no fainting [Chills] : no chills [Fever] : no fever [Stable] : stable [de-identified] : 2 years and three years postop from Revision Lumbar laminectomy L2- S1 \par Pain began about 5 months ago\par Pain left sided, superior to incision and about incision line\par Pain radiates down left leg to shin and foot\par Feels like "ants are crawling" left shin \par Has been using lidocaine patch \par Receiving PT for the shoulders. \par Currently taking Tylenol.\par No fever chills sweats nausea vomiting no bowel,  no recent weight loss or gain no night pain. This history is in addition to the intake form that I personally reviewed.

## 2019-11-27 NOTE — PROGRESS NOTE ADULT - SUBJECTIVE AND OBJECTIVE BOX
ORTHO/SPINE PA NOTE    PILI PAULINO 83yFemale  was seen laying in bed comfortable w/o complaints.  Resolved legs pains from pre-op.  Overall doing well.  Ambulated yesterday  with PT, still with melendez catheter. Denies any CP, SOB, HA's.    Spine-  nickel size blood tinge proximal/intact;   RLE- motor 5/5 quad, TA, EHL, GS  LLE- motor 5/5 quad, TA, EHL, GS          NVI distally and symmetrical    A/P-83y Female s/p laminectomy and dural repair  -Analgesia  -PT/OT/OOB ambulate  -cont IS  -cont DVT PPX  -check labs  -F/u internal med recs  -D/C planning to rehab today  -D/w ASMITA Byers DISPLAY PLAN FREE TEXT

## 2022-02-09 NOTE — H&P PST ADULT - ENDOCRINE COMMENTS
Pending Prescriptions:                       Disp   Refills    eszopiclone (LUNESTA) 2 MG tablet         30 tab*0            Sig: Take 1 tablet (2 mg) by mouth nightly as needed           for sleep    Routing refill request to provider for review/approval because:  Drug not on the [facility] refill protocol       Jermaine Arora RN           Hx of total thyroidectomy for nodules - pt controlled on supplement - dose change 3 months ago w/o BW to check

## 2022-08-09 NOTE — H&P PST ADULT - RESPIRATORY AND THORAX
LMOM for pt to call back to schedule 
lmom to schedule appt
lmom to schedule appt  -3rd attempt
details…

## 2023-12-08 NOTE — H&P PST ADULT - BREASTS
CALLED PT AND LET HER KNOW HER [place] PAPERWORK IS READY TO BE PICKED UP AT THE    
No masses; no nipple discharge

## 2024-11-15 NOTE — ASU PATIENT PROFILE, ADULT - NS TRANSFER PROSTHESIS:
no
No. KAI screening performed.  STOP BANG Legend: 0-2 = LOW Risk; 3-4 = INTERMEDIATE Risk; 5-8 = HIGH Risk